# Patient Record
Sex: FEMALE | Race: WHITE | Employment: OTHER | URBAN - NONMETROPOLITAN AREA
[De-identification: names, ages, dates, MRNs, and addresses within clinical notes are randomized per-mention and may not be internally consistent; named-entity substitution may affect disease eponyms.]

---

## 2020-06-08 ENCOUNTER — HOSPITAL ENCOUNTER (OUTPATIENT)
Dept: GENERAL RADIOLOGY | Age: 70
Discharge: HOME | End: 2020-06-08
Payer: MEDICARE

## 2020-06-08 ENCOUNTER — HOSPITAL ENCOUNTER (OUTPATIENT)
Age: 70
Discharge: HOME | End: 2020-06-08
Payer: MEDICARE

## 2020-06-08 PROCEDURE — 72100 X-RAY EXAM L-S SPINE 2/3 VWS: CPT

## 2020-06-08 PROCEDURE — 73521 X-RAY EXAM HIPS BI 2 VIEWS: CPT

## 2020-07-31 ENCOUNTER — HOSPITAL ENCOUNTER (OUTPATIENT)
Dept: MRI IMAGING | Age: 70
Discharge: HOME | End: 2020-07-31
Payer: MEDICARE

## 2020-07-31 PROCEDURE — 72148 MRI LUMBAR SPINE W/O DYE: CPT

## 2020-09-25 ENCOUNTER — HOSPITAL ENCOUNTER (OUTPATIENT)
Dept: NON INVASIVE DIAGNOSTICS | Age: 70
Discharge: HOME | End: 2020-09-25
Payer: MEDICARE

## 2020-09-25 VITALS — WEIGHT: 203 LBS | HEIGHT: 64 IN | BODY MASS INDEX: 34.66 KG/M2

## 2020-09-25 PROCEDURE — A9500 TC99M SESTAMIBI: HCPCS | Performed by: NUCLEAR MEDICINE

## 2020-09-25 PROCEDURE — 93018 CV STRESS TEST I&R ONLY: CPT | Performed by: NUCLEAR MEDICINE

## 2020-09-25 PROCEDURE — 3430000000 HC RX DIAGNOSTIC RADIOPHARMACEUTICAL: Performed by: NUCLEAR MEDICINE

## 2020-09-25 PROCEDURE — 78452 HT MUSCLE IMAGE SPECT MULT: CPT

## 2020-09-25 PROCEDURE — 93016 CV STRESS TEST SUPVJ ONLY: CPT | Performed by: NUCLEAR MEDICINE

## 2020-09-25 PROCEDURE — 93017 CV STRESS TEST TRACING ONLY: CPT | Performed by: NUCLEAR MEDICINE

## 2020-09-25 PROCEDURE — 6360000002 HC RX W HCPCS

## 2020-09-25 PROCEDURE — 78452 HT MUSCLE IMAGE SPECT MULT: CPT | Performed by: NUCLEAR MEDICINE

## 2020-09-25 RX ORDER — HYDRALAZINE HYDROCHLORIDE 50 MG/1
50 TABLET, FILM COATED ORAL 3 TIMES DAILY
COMMUNITY

## 2020-09-25 RX ORDER — IRBESARTAN 300 MG/1
300 TABLET ORAL NIGHTLY
COMMUNITY

## 2020-09-25 RX ORDER — PANTOPRAZOLE SODIUM 40 MG/1
40 TABLET, DELAYED RELEASE ORAL DAILY
COMMUNITY

## 2020-09-25 RX ORDER — POTASSIUM CHLORIDE 7.45 MG/ML
10 INJECTION INTRAVENOUS ONCE
Status: ON HOLD | COMMUNITY
End: 2020-10-05 | Stop reason: HOSPADM

## 2020-09-25 RX ORDER — MELOXICAM 15 MG/1
15 TABLET ORAL DAILY
COMMUNITY

## 2020-09-25 RX ORDER — PAROXETINE HYDROCHLORIDE 20 MG/1
20 TABLET, FILM COATED ORAL EVERY MORNING
COMMUNITY

## 2020-09-25 RX ORDER — AMLODIPINE BESYLATE 10 MG/1
10 TABLET ORAL DAILY
COMMUNITY

## 2020-09-25 RX ADMIN — Medication 9.3 MILLICURIE: at 07:52

## 2020-09-25 RX ADMIN — Medication 33.3 MILLICURIE: at 08:50

## 2020-09-30 ENCOUNTER — TELEPHONE (OUTPATIENT)
Dept: CARDIOLOGY CLINIC | Age: 70
End: 2020-09-30

## 2020-09-30 NOTE — TELEPHONE ENCOUNTER
Muriel LEON on nurse line waiting for cardiac clearance. I called Ari Bhagat back and she is aware we haven't seen patient in the office. Stress test ordered by Dr. Olaf Delgado and completed by Dr. Jenn Og. She will call back if they need anything else.

## 2020-10-02 ENCOUNTER — APPOINTMENT (OUTPATIENT)
Dept: ULTRASOUND IMAGING | Age: 70
DRG: 684 | End: 2020-10-02
Payer: MEDICARE

## 2020-10-02 ENCOUNTER — APPOINTMENT (OUTPATIENT)
Dept: CT IMAGING | Age: 70
DRG: 684 | End: 2020-10-02
Payer: MEDICARE

## 2020-10-02 ENCOUNTER — HOSPITAL ENCOUNTER (INPATIENT)
Age: 70
LOS: 3 days | Discharge: HOME | DRG: 684 | End: 2020-10-05
Attending: FAMILY MEDICINE | Admitting: FAMILY MEDICINE
Payer: MEDICARE

## 2020-10-02 DIAGNOSIS — R63.0 ANOREXIA: ICD-10-CM

## 2020-10-02 DIAGNOSIS — N17.9 AKI (ACUTE KIDNEY INJURY) (HCC): Primary | ICD-10-CM

## 2020-10-02 LAB
ALBUMIN SERPL-MCNC: 3.6 G/DL (ref 3.5–5.1)
ALP BLD-CCNC: 92 U/L (ref 38–126)
ALT SERPL-CCNC: 21 U/L (ref 11–66)
ANION GAP SERPL CALCULATED.3IONS-SCNC: 15 MEQ/L (ref 8–16)
AST SERPL-CCNC: 19 U/L (ref 5–40)
BASOPHILS # BLD: 0.3 %
BASOPHILS ABSOLUTE: 0.1 THOU/MM3 (ref 0–0.1)
BILIRUB SERPL-MCNC: 0.5 MG/DL (ref 0.3–1.2)
BILIRUBIN DIRECT: < 0.2 MG/DL (ref 0–0.3)
BUN BLDV-MCNC: 10 MG/DL (ref 7–22)
CALCIUM SERPL-MCNC: 10 MG/DL (ref 8.5–10.5)
CEA: 1.5 NG/ML (ref 0–5)
CHLORIDE BLD-SCNC: 103 MEQ/L (ref 98–111)
CO2: 22 MEQ/L (ref 23–33)
CREAT SERPL-MCNC: 1.7 MG/DL (ref 0.4–1.2)
EKG ATRIAL RATE: 112 BPM
EKG P AXIS: 41 DEGREES
EKG P-R INTERVAL: 136 MS
EKG Q-T INTERVAL: 344 MS
EKG QRS DURATION: 94 MS
EKG QTC CALCULATION (BAZETT): 469 MS
EKG R AXIS: 29 DEGREES
EKG T AXIS: 7 DEGREES
EKG VENTRICULAR RATE: 112 BPM
EOSINOPHIL # BLD: 0.3 %
EOSINOPHILS ABSOLUTE: 0.1 THOU/MM3 (ref 0–0.4)
ERYTHROCYTE [DISTWIDTH] IN BLOOD BY AUTOMATED COUNT: 12.8 % (ref 11.5–14.5)
ERYTHROCYTE [DISTWIDTH] IN BLOOD BY AUTOMATED COUNT: 43.2 FL (ref 35–45)
GFR SERPL CREATININE-BSD FRML MDRD: 30 ML/MIN/1.73M2
GLUCOSE BLD-MCNC: 111 MG/DL (ref 70–108)
HCT VFR BLD CALC: 33.3 % (ref 37–47)
HEMOGLOBIN: 10.8 GM/DL (ref 12–16)
IMMATURE GRANS (ABS): 0.17 THOU/MM3 (ref 0–0.07)
IMMATURE GRANULOCYTES: 0.9 %
LD: 181 U/L (ref 100–190)
LIPASE: 52 U/L (ref 5.6–51.3)
LYMPHOCYTES # BLD: 0.9 %
LYMPHOCYTES ABSOLUTE: 0.2 THOU/MM3 (ref 1–4.8)
MAGNESIUM: 1.6 MG/DL (ref 1.6–2.4)
MCH RBC QN AUTO: 29.8 PG (ref 26–33)
MCHC RBC AUTO-ENTMCNC: 32.4 GM/DL (ref 32.2–35.5)
MCV RBC AUTO: 91.7 FL (ref 81–99)
MONOCYTES # BLD: 0.5 %
MONOCYTES ABSOLUTE: 0.1 THOU/MM3 (ref 0.4–1.3)
NUCLEATED RED BLOOD CELLS: 0 /100 WBC
OSMOLALITY CALCULATION: 279.1 MOSMOL/KG (ref 275–300)
PLATELET # BLD: 386 THOU/MM3 (ref 130–400)
PMV BLD AUTO: 9.3 FL (ref 9.4–12.4)
POTASSIUM REFLEX MAGNESIUM: 2.9 MEQ/L (ref 3.5–5.2)
RBC # BLD: 3.63 MILL/MM3 (ref 4.2–5.4)
SEG NEUTROPHILS: 97.1 %
SEGMENTED NEUTROPHILS ABSOLUTE COUNT: 19.2 THOU/MM3 (ref 1.8–7.7)
SODIUM BLD-SCNC: 140 MEQ/L (ref 135–145)
TOTAL PROTEIN: 6.8 G/DL (ref 6.1–8)
TSH SERPL DL<=0.05 MIU/L-ACNC: 0.61 UIU/ML (ref 0.4–4.2)
WBC # BLD: 19.8 THOU/MM3 (ref 4.8–10.8)

## 2020-10-02 PROCEDURE — 96361 HYDRATE IV INFUSION ADD-ON: CPT

## 2020-10-02 PROCEDURE — 93005 ELECTROCARDIOGRAM TRACING: CPT | Performed by: NURSE PRACTITIONER

## 2020-10-02 PROCEDURE — 6360000002 HC RX W HCPCS: Performed by: NURSE PRACTITIONER

## 2020-10-02 PROCEDURE — 6360000004 HC RX CONTRAST MEDICATION: Performed by: NURSE PRACTITIONER

## 2020-10-02 PROCEDURE — 80076 HEPATIC FUNCTION PANEL: CPT

## 2020-10-02 PROCEDURE — 2580000003 HC RX 258: Performed by: NURSE PRACTITIONER

## 2020-10-02 PROCEDURE — 74177 CT ABD & PELVIS W/CONTRAST: CPT

## 2020-10-02 PROCEDURE — 96374 THER/PROPH/DIAG INJ IV PUSH: CPT

## 2020-10-02 PROCEDURE — 1200000003 HC TELEMETRY R&B

## 2020-10-02 PROCEDURE — 83735 ASSAY OF MAGNESIUM: CPT

## 2020-10-02 PROCEDURE — 36415 COLL VENOUS BLD VENIPUNCTURE: CPT

## 2020-10-02 PROCEDURE — 83690 ASSAY OF LIPASE: CPT

## 2020-10-02 PROCEDURE — 94760 N-INVAS EAR/PLS OXIMETRY 1: CPT

## 2020-10-02 PROCEDURE — 99284 EMERGENCY DEPT VISIT MOD MDM: CPT

## 2020-10-02 PROCEDURE — 82728 ASSAY OF FERRITIN: CPT

## 2020-10-02 PROCEDURE — 96376 TX/PRO/DX INJ SAME DRUG ADON: CPT

## 2020-10-02 PROCEDURE — 99223 1ST HOSP IP/OBS HIGH 75: CPT | Performed by: FAMILY MEDICINE

## 2020-10-02 PROCEDURE — 84443 ASSAY THYROID STIM HORMONE: CPT

## 2020-10-02 PROCEDURE — 85025 COMPLETE CBC W/AUTO DIFF WBC: CPT

## 2020-10-02 PROCEDURE — 82378 CARCINOEMBRYONIC ANTIGEN: CPT

## 2020-10-02 PROCEDURE — 80048 BASIC METABOLIC PNL TOTAL CA: CPT

## 2020-10-02 PROCEDURE — 83615 LACTATE (LD) (LDH) ENZYME: CPT

## 2020-10-02 PROCEDURE — 83540 ASSAY OF IRON: CPT

## 2020-10-02 PROCEDURE — 84466 ASSAY OF TRANSFERRIN: CPT

## 2020-10-02 RX ORDER — LOSARTAN POTASSIUM 25 MG/1
25 TABLET ORAL DAILY
Status: DISCONTINUED | OUTPATIENT
Start: 2020-10-02 | End: 2020-10-03

## 2020-10-02 RX ORDER — SODIUM CHLORIDE 0.9 % (FLUSH) 0.9 %
10 SYRINGE (ML) INJECTION PRN
Status: DISCONTINUED | OUTPATIENT
Start: 2020-10-02 | End: 2020-10-05 | Stop reason: HOSPADM

## 2020-10-02 RX ORDER — HYDRALAZINE HYDROCHLORIDE 50 MG/1
50 TABLET, FILM COATED ORAL 3 TIMES DAILY
Status: DISCONTINUED | OUTPATIENT
Start: 2020-10-02 | End: 2020-10-03

## 2020-10-02 RX ORDER — PROMETHAZINE HYDROCHLORIDE 25 MG/1
12.5 TABLET ORAL EVERY 6 HOURS PRN
Status: DISCONTINUED | OUTPATIENT
Start: 2020-10-02 | End: 2020-10-05 | Stop reason: HOSPADM

## 2020-10-02 RX ORDER — HEPARIN SODIUM 5000 [USP'U]/ML
5000 INJECTION, SOLUTION INTRAVENOUS; SUBCUTANEOUS EVERY 8 HOURS SCHEDULED
Status: DISCONTINUED | OUTPATIENT
Start: 2020-10-02 | End: 2020-10-03

## 2020-10-02 RX ORDER — PANTOPRAZOLE SODIUM 40 MG/1
40 TABLET, DELAYED RELEASE ORAL DAILY
Status: DISCONTINUED | OUTPATIENT
Start: 2020-10-03 | End: 2020-10-05 | Stop reason: HOSPADM

## 2020-10-02 RX ORDER — PAROXETINE HYDROCHLORIDE 20 MG/1
20 TABLET, FILM COATED ORAL EVERY MORNING
Status: DISCONTINUED | OUTPATIENT
Start: 2020-10-03 | End: 2020-10-05 | Stop reason: HOSPADM

## 2020-10-02 RX ORDER — AMLODIPINE BESYLATE 10 MG/1
10 TABLET ORAL DAILY
Status: DISCONTINUED | OUTPATIENT
Start: 2020-10-02 | End: 2020-10-03

## 2020-10-02 RX ORDER — SODIUM CHLORIDE 9 MG/ML
INJECTION, SOLUTION INTRAVENOUS CONTINUOUS
Status: DISCONTINUED | OUTPATIENT
Start: 2020-10-02 | End: 2020-10-05 | Stop reason: HOSPADM

## 2020-10-02 RX ORDER — SODIUM CHLORIDE 0.9 % (FLUSH) 0.9 %
10 SYRINGE (ML) INJECTION EVERY 12 HOURS SCHEDULED
Status: DISCONTINUED | OUTPATIENT
Start: 2020-10-02 | End: 2020-10-05 | Stop reason: HOSPADM

## 2020-10-02 RX ORDER — ACETAMINOPHEN 650 MG/1
650 SUPPOSITORY RECTAL EVERY 6 HOURS PRN
Status: DISCONTINUED | OUTPATIENT
Start: 2020-10-02 | End: 2020-10-05 | Stop reason: HOSPADM

## 2020-10-02 RX ORDER — ONDANSETRON 2 MG/ML
4 INJECTION INTRAMUSCULAR; INTRAVENOUS EVERY 6 HOURS PRN
Status: DISCONTINUED | OUTPATIENT
Start: 2020-10-02 | End: 2020-10-05 | Stop reason: HOSPADM

## 2020-10-02 RX ORDER — 0.9 % SODIUM CHLORIDE 0.9 %
1000 INTRAVENOUS SOLUTION INTRAVENOUS ONCE
Status: COMPLETED | OUTPATIENT
Start: 2020-10-02 | End: 2020-10-02

## 2020-10-02 RX ORDER — POTASSIUM CHLORIDE 7.45 MG/ML
20 INJECTION INTRAVENOUS ONCE
Status: DISCONTINUED | OUTPATIENT
Start: 2020-10-02 | End: 2020-10-02 | Stop reason: CLARIF

## 2020-10-02 RX ORDER — ACETAMINOPHEN 325 MG/1
650 TABLET ORAL EVERY 6 HOURS PRN
Status: DISCONTINUED | OUTPATIENT
Start: 2020-10-02 | End: 2020-10-05 | Stop reason: HOSPADM

## 2020-10-02 RX ORDER — POLYETHYLENE GLYCOL 3350 17 G/17G
17 POWDER, FOR SOLUTION ORAL DAILY PRN
Status: DISCONTINUED | OUTPATIENT
Start: 2020-10-02 | End: 2020-10-05 | Stop reason: HOSPADM

## 2020-10-02 RX ORDER — POTASSIUM CHLORIDE 7.45 MG/ML
10 INJECTION INTRAVENOUS
Status: COMPLETED | OUTPATIENT
Start: 2020-10-02 | End: 2020-10-02

## 2020-10-02 RX ADMIN — POTASSIUM CHLORIDE 10 MEQ: 7.46 INJECTION, SOLUTION INTRAVENOUS at 20:01

## 2020-10-02 RX ADMIN — SODIUM CHLORIDE 1000 ML: 9 INJECTION, SOLUTION INTRAVENOUS at 17:30

## 2020-10-02 RX ADMIN — IOPAMIDOL 80 ML: 755 INJECTION, SOLUTION INTRAVENOUS at 18:21

## 2020-10-02 RX ADMIN — POTASSIUM CHLORIDE 10 MEQ: 7.46 INJECTION, SOLUTION INTRAVENOUS at 21:09

## 2020-10-02 SDOH — HEALTH STABILITY: MENTAL HEALTH: HOW OFTEN DO YOU HAVE A DRINK CONTAINING ALCOHOL?: NEVER

## 2020-10-02 ASSESSMENT — ENCOUNTER SYMPTOMS
VOMITING: 1
BLOOD IN STOOL: 0
NAUSEA: 1
SORE THROAT: 0
CHEST TIGHTNESS: 0
ABDOMINAL PAIN: 0
SINUS PRESSURE: 0
DIARRHEA: 1
SHORTNESS OF BREATH: 0
CONSTIPATION: 0

## 2020-10-02 NOTE — H&P
HISTORY & PHYSICAL       Patient:  Mat Mims  YOB: 1950  MRN: [id number]     Acct: [de-identified]    PCP: Makayla Perez MD    Date of Admission: 10/2/2020    Date of Service: Pt seen/examined in emergency department with expected LOS less than two midnights due to medical therapy. ASSESSMENT/PLAN:    1. EYAL, stage I   Creatinine 1.7 in ED   Likely secondary to combination of anemia and dehydration   IV fluids 75 mL/hour   Daily BMP  2. Normocytic, normochromic anemia  · Hemoglobin 10.8 in the ED  · No obvious source of bleeding  · Patient scheduled for colonoscopy on 10//6 oupatient  · Iron studies present a mixed iron deficiency and anemia of chronic disease picture. Recheck ferritin once outpatient since acute phase reactant. · Consult GI for colonoscopy  3. Hypokalemia  · 2.9 in the ED  · 30 mEq potassium given in ED via IV  · Likely secondary to vomiting/diarrhea  · Recheck BMP  4. Leukocytosis  · WBC 19.8 in ED  · Greater than 15 pound weight loss in the last month  · Loss of appetite  · Elevated segmented neutrophils, likely reactive  · Continue to trend CBC  5. Vomiting/diarrhea   Patient had numerous episodes of vomiting today   She has had diarrhea for last 2 weeks   PRN zofran   PRN immodium  6. Complex structure of the left kidney  · Per impression on CT, could be a hematoma but there is no definite gas within it and it will need to be followed   · Ultrasound of kidneys pending  7. Adrenal nodule, right   Seen incidentally on CT   Colonoscopy first, if inconclusive for source of anemia and weight loss, work up  8.  Hypertension, with concern for orthostatic hypotension  · Hold home amlodipine 10 mg po daily    Hold home hydralazine 50 mg po TID   Hold BP meds for now due to lightheadedness   Orthostatics pending      Chief Complaint:  EYAL      History Of Present Illness:     77 y.o. female who presented to [address] [place] [address] with nausea, vomiting, chills, shaking, and lightheadedness following a lab draw earlier today. She has been having decreased appetite for the last 4 weeks, which she initially thought was due to food poisoning. It initially started with nausea and vomiting. She has barely eaten anything for the last 4 weeks, and mostly drinks water or Gatorade. She has been having increased fatigue and weakness. Patient does not know her family history because she is adopted. She has never had a colonoscopy, but has not noticed any changes in her bowel habits. She denies dark or tarry stools. She does have diarrhea frequently for the last 2 weeks. She denies chest pain or shortness of breath, including with exertion. Past Medical History:          Diagnosis Date    Arthritis     Hyperlipidemia     Hypertension        Past Surgical History:          Procedure Laterality Date    CARPAL TUNNEL RELEASE Bilateral     CHOLECYSTECTOMY      HYSTERECTOMY      partial    SINUS SURGERY         Medications Prior to Admission:      Prior to Admission medications    Medication Sig Start Date End Date Taking? Authorizing Provider   irbesartan (AVAPRO) 300 MG tablet Take 300 mg by mouth nightly    Historical Provider, MD   potassium chloride 10 MEQ/100ML Infuse 10 mEq intravenously once    Historical Provider, MD   amLODIPine (NORVASC) 10 MG tablet Take 10 mg by mouth daily    Historical Provider, MD   meloxicam (MOBIC) 15 MG tablet Take 15 mg by mouth daily    Historical Provider, MD   hydrALAZINE (APRESOLINE) 50 MG tablet Take 50 mg by mouth 3 times daily    Historical Provider, MD   pantoprazole (PROTONIX) 40 MG tablet Take 40 mg by mouth daily    Historical Provider, MD   PARoxetine (PAXIL) 20 MG tablet Take 20 mg by mouth every morning    Historical Provider, MD       Allergies:  Cymbalta [duloxetine hcl]    Social History:      The patient currently lives at home. TOBACCO:   reports that she has never smoked.  She has never used smokeless tobacco.  ETOH:   reports no history of alcohol use. Family History:      Reviewed in detail for DM, CAD, Cancer, CVA. Positive as follows:    History reviewed. No pertinent family history. Diet:  Diet NPO Effective Now    Constitutional: Negative for chills and fever. HENT: Negative for congestion and sore throat. Eyes: Negative for visual disturbance. Respiratory: Negative for cough and shortness of breath. Cardiovascular: Negative for chest pain. Gastrointestinal: Negative for abdominal pain. She denies blood in stools or vomit. Positive for diarrhea, nausea, and vomiting. Genitourinary: Negative for dysuria. Skin: Negative for rash. Neurological: Negative for dizziness and headaches. Positive for weakness and lightheadedness. Psychiatric/Behavioral: The patient is not nervous/anxious. PHYSICAL EXAM:    BP (!) 116/57   Pulse 78   Temp 97.9 °F (36.6 °C) (Oral)   Resp 18   Ht 5' 4\" (1.626 m)   Wt 199 lb (90.3 kg)   SpO2 97%   BMI 34.16 kg/m²     Constitutional:       General: she is not in acute distress. She is very pale. Appearance: Normal appearance. HENT:      Head: Normocephalic. Nose: Nose normal.      Mouth: Mucous membranes are moist.   Eyes:      General: No scleral icterus. Extraocular Movements: Extraocular movements intact. Neck:      Musculoskeletal: Normal range of motion. Cardiovascular:      Rate and Rhythm: Regular rhythm. Tachycardia. .      Pulses: Normal pulses. Heart sounds: Normal heart sounds. Pulmonary:      Effort: Pulmonary effort is normal.      Breath sounds: Normal breath sounds. Abdominal:      General: Abdomen is flat. There is no distension. Palpations: Abdomen is soft. Tender to palpation in right lower quadrant. Musculoskeletal: Normal range of motion. Skin:     General: Skin is warm and dry. Very pale appearing. Neurological:      Mental Status: she is alert. Motor: No weakness. Psychiatric:         Mood and Affect: Mood normal.     Labs:     Recent Labs     10/02/20  1657   WBC 19.8*   HGB 10.8*   HCT 33.3*        Recent Labs     10/02/20  1657      K 2.9*      CO2 22*   BUN 10   CREATININE 1.7*   CALCIUM 10.0     Recent Labs     10/02/20  1657   AST 19   ALT 21   BILIDIR <0.2   BILITOT 0.5   ALKPHOS 92     No results for input(s): INR in the last 72 hours. No results for input(s): Maroa Lager in the last 72 hours. Urinalysis:    No results found for: NITRU, WBCUA, BACTERIA, RBCUA, BLOODU, SPECGRAV, GLUCOSEU    Intake & Output:  I/O last 3 completed shifts: In: 48 [P.O.:50]  Out: -   No intake/output data recorded. Radiology:     CT ABDOMEN PELVIS W IV CONTRAST Additional Contrast? None   Final Result   Complex structure extending out of the left kidney could relate to a hematoma as there is no definite gas within it. It will need to be followed. No definite bowel lesion. Small right adrenal nodule. **This report has been created using voice recognition software. It may contain minor errors which are inherent in voice recognition technology. **      Final report electronically signed by Dr. Marquez June on 10/2/2020 6:44 PM      US RENAL COMPLETE    (Results Pending)            DVT prophylaxis: [] Lovenox                                 [x] SCDs                                 [] SQ Heparin                                 [] Encourage ambulation           [] Already on Anticoagulation    Code Status: Full Code    Disposition:    [x] Home       [] TCU       [] Rehab       [] Psych       [] SNF       [] [place]       [] Other-      Thank you Cisco Alba MD for the opportunity to be involved in this patient's care.     Electronically signed by Jorge Luis Kothari DO on 10/3/2020 at 5:00 AM

## 2020-10-02 NOTE — ED NOTES
Patient returns from radiology, updated on plan of care and provided with ice chips for comfort.       Carmen Camargo RN  10/02/20 6622

## 2020-10-02 NOTE — ED NOTES
Family at bedside states that patient has not had an appetite at all and they have been unable to get her to eat x 48 hours.      Willian Howard RN  10/02/20 1123

## 2020-10-02 NOTE — ED PROVIDER NOTES
[facility]  EMERGENCY DEPARTMENT ENCOUNTER          CHIEF COMPLAINT     No chief complaint on file. Nurses Notes reviewed and I agree except as noted in the HPI. HISTORY OF PRESENT ILLNESS    Mat Mims is a 77 y.o. female who presents to the Emergency Department for the evaluation of decreased appetite, nausea, and vomiting. The patient states that she got lab work drawn earlier today, and immediately following her lab draw she experienced an episode of nausea, vomiting, chills, shaking, and lightheadedness. She states that she has been experiencing decreased appetite for at least 4 weeks. Her daughter states that when symptoms began she experienced 2 days of nausea and vomiting, which they assumed was food poisoning. Once the nausea and vomiting subsided, the anorexia remained and has not improved over the last 4 weeks. She states she has not had vomiting during this time. She states if she is able to eat anything, it is only a bite or 2 daily. She is drinking 1-1.5 bottles of water or Gatorade daily. She has not had anything to eat in at least 48 hours. Associated symptoms include dry mouth, loose stools, fatigue, weakness, and belching. Her daughter states that per her PCP office she has experienced a 15 pound weight loss in the last 3 weeks. She has been seeking care from her PCP during this time and has an EGD and Colonoscopy scheduled for Tuesday, 10/6/20, with Dr Jessi Singh. She states that she has had a hysterectomy, cholecystectomy, and possibly an appendectomy. She denies chest pain, palpitations, shortness of breath, dizzines, blood in stools, urine or vomit, fever, or dysuria. The HPI was provided by the patient. REVIEW OF SYSTEMS     Review of Systems   Constitutional: Positive for appetite change, chills and fatigue. Negative for fever. HENT: Negative for congestion, sinus pressure and sore throat. Eyes: Negative for visual disturbance.    Respiratory: Negative for chest tightness and shortness of breath. Cardiovascular: Negative for chest pain and palpitations. Gastrointestinal: Positive for diarrhea, nausea and vomiting. Negative for abdominal pain, blood in stool and constipation. Genitourinary: Negative for difficulty urinating, dysuria, frequency and hematuria. Neurological: Positive for weakness and light-headedness. Negative for dizziness and syncope. PAST MEDICAL HISTORY    has a past medical history of Arthritis, Hyperlipidemia, and Hypertension. SURGICAL HISTORY      has a past surgical history that includes Hysterectomy; Cholecystectomy; sinus surgery; Carpal tunnel release (Bilateral); and Upper gastrointestinal endoscopy (N/A, 10/4/2020). CURRENT MEDICATIONS       Discharge Medication List as of 10/5/2020  3:16 PM      CONTINUE these medications which have NOT CHANGED    Details   irbesartan (AVAPRO) 300 MG tablet Take 300 mg by mouth nightlyHistorical Med      amLODIPine (NORVASC) 10 MG tablet Take 10 mg by mouth dailyHistorical Med      meloxicam (MOBIC) 15 MG tablet Take 15 mg by mouth dailyHistorical Med      hydrALAZINE (APRESOLINE) 50 MG tablet Take 50 mg by mouth 3 times dailyHistorical Med      pantoprazole (PROTONIX) 40 MG tablet Take 40 mg by mouth dailyHistorical Med      PARoxetine (PAXIL) 20 MG tablet Take 20 mg by mouth every morningHistorical Med             ALLERGIES     is allergic to cymbalta [duloxetine hcl]. FAMILY HISTORY     has no family status information on file. family history is not on file. SOCIAL HISTORY      reports that she has never smoked. She has never used smokeless tobacco. She reports that she does not drink alcohol or use drugs. PHYSICAL EXAM     INITIAL VITALS:  height is 5' 4\" (1.626 m) and weight is 199 lb (90.3 kg). Her oral temperature is 97.9 °F (36.6 °C). Her blood pressure is 146/69 (abnormal) and her pulse is 81. Her respiration is 18 and oxygen saturation is 97%. Physical Exam  Vitals signs and nursing note reviewed. Constitutional:       Appearance: Normal appearance. HENT:      Head: Normocephalic and atraumatic. Mouth/Throat:      Mouth: Mucous membranes are moist.      Pharynx: Oropharynx is clear. Eyes:      Comments: Conjunctiva pale   Cardiovascular:      Rate and Rhythm: Regular rhythm. Tachycardia present. Heart sounds: Normal heart sounds. Pulmonary:      Effort: Pulmonary effort is normal.      Breath sounds: Normal breath sounds. Abdominal:      General: Bowel sounds are normal.      Palpations: Abdomen is soft. Tenderness: There is abdominal tenderness (RLQ) in the right lower quadrant. There is no guarding. Comments: Heartbeat heard diffusely throughout abdomen   Skin:     General: Skin is warm and dry. Neurological:      General: No focal deficit present. Mental Status: She is alert. DIFFERENTIAL DIAGNOSIS:   Dehydration, EYAL, malignancy, bowel obstruction, ileus    DIAGNOSTIC RESULTS     EKG: All EKG's are interpreted by the Emergency Department Physician who either signs or Co-signs this chart in the absence of a cardiologist.    Sinus tachycardia with rate of 112, , QRS of 94, QTc of 469. No previous EKG for comparison    EKG interpreted by ED physician    RADIOLOGY: non-plainfilm images(s) such as CT, Ultrasound and MRI are read by the radiologist.    US RENAL COMPLETE   Final Result   1. The abnormal soft tissue density posterior to the left kidney and involving the left psoas muscle seen on recent CT scan was not demonstrated on today's renal ultrasound. Findings on CT are suspicious for hematoma. Clinical correlation recommended. Follow-up CT of the abdomen or MRI of the abdomen would be helpful for further evaluation. 2. 2 cm benign-appearing left renal cyst. Findings suggestive of medical renal disease. **This report has been created using voice recognition software.   It may contain minor errors which are inherent in voice recognition technology. **      Final report electronically signed by Dr. Elke Fuller on 10/3/2020 8:30 AM      CT ABDOMEN PELVIS W IV CONTRAST Additional Contrast? None   Final Result   Complex structure extending out of the left kidney could relate to a hematoma as there is no definite gas within it. It will need to be followed. No definite bowel lesion. Small right adrenal nodule. **This report has been created using voice recognition software. It may contain minor errors which are inherent in voice recognition technology. **      Final report electronically signed by Dr. Douglas Foster on 10/2/2020 6:44 PM          LABS:     Labs Reviewed   CBC WITH AUTO DIFFERENTIAL - Abnormal; Notable for the following components:       Result Value    WBC 19.8 (*)     RBC 3.63 (*)     Hemoglobin 10.8 (*)     Hematocrit 33.3 (*)     MPV 9.3 (*)     Segs Absolute 19.2 (*)     Lymphocytes Absolute 0.2 (*)     Monocytes Absolute 0.1 (*)     Immature Grans (Abs) 0.17 (*)     All other components within normal limits   BASIC METABOLIC PANEL W/ REFLEX TO MG FOR LOW K - Abnormal; Notable for the following components:    Potassium reflex Magnesium 2.9 (*)     CO2 22 (*)     Glucose 111 (*)     CREATININE 1.7 (*)     All other components within normal limits   LIPASE - Abnormal; Notable for the following components:    Lipase 52.0 (*)     All other components within normal limits   GLOMERULAR FILTRATION RATE, ESTIMATED - Abnormal; Notable for the following components:    Est, Glom Filt Rate 30 (*)     All other components within normal limits   BASIC METABOLIC PANEL W/ REFLEX TO MG FOR LOW K - Abnormal; Notable for the following components:    Potassium reflex Magnesium 3.0 (*)     CREATININE 1.3 (*)     All other components within normal limits   CBC - Abnormal; Notable for the following components:    WBC 21.4 (*)     RBC 3.13 (*)     Hemoglobin 9.3 (*) TSH WITHOUT REFLEX   LACTATE DEHYDROGENASE   HEPATIC FUNCTION PANEL   ANION GAP   MAGNESIUM   OSMOLALITY   CEA   ANION GAP   MAGNESIUM   OSMOLALITY   ANION [facility]   SURGICAL PATHOLOGY    Narrative:     [facility]               [phone]  [address], [address]                                                      PROC: 10/04/2020  [facility]                                    RECV: 10/05/2020  [address]                                    RPTD: 10/06/2020  [facility], [address]                      MRN:  [id number]     LOC: 7KN                      ACCT: [de-identified]  SEX: F                      : 1950  AGE: 77 Y                         PATHOLOGY REPORT                      ATTN: Lamberto Clarke                      REQ: JEFF GALEAS      Copies To:   Oscar Clifton; Lamberto Clarke      Clinical Information: VOMITING, DIARRHEA, REDUCED APPETITE    FINAL DIAGNOSIS:  Small bowel, biopsy:   Slightly increased intraepithelial lymphocytes and preserved villous  architecture. Specimen:  SMALL BOWEL BIOPSY, R/O MALABSORPTION      Gross Examination:  The container is labeled Annie Edmondson, small bowel biopsies, rule  out malabsorption. Received in formalin are multiple bits of tan  tissue aggregating to 0.6 x 0.5 x 0.2 cm.  1 ns. PCF/DKR:[id number]      Microscopic Examination:  Sections show duodenal mucosa with minimal attenuation of villous  architecture and a slight increase of intraepithelial lymphocytes,  including at occasional villi tips. The lamina propria shows a  relatively normal complement of inflammatory cells. The findings are  relatively nonspecific. The histologic differential diagnosis includes  celiac disease, Helicobacter gastritis, NSAID injury, bacterial  overgrowth, and autoimmune conditions, among others. Clinical  correlation is recommended.     [id number] <Sign Out Dr. Leana Mcneal M.D., F.C. A. P      [facility]/ [address] [address]  Printed on:  10/6/2020  Patti Raza [address]  [address], [address]  Original print date: 10/06/2020   URINE RT REFLEX TO CULTURE   BLOOD OCCULT STOOL SCREEN #1   MISCELLANEOUS LAB TEST #1   MISCELLANEOUS LAB TEST #2   MISCELLANEOUS LAB TEST #3       EMERGENCY DEPARTMENT COURSE:   Vitals:    Vitals:    10/05/20 0345 10/05/20 0800 10/05/20 1041 10/05/20 1100   BP: (!) 140/69 135/71  (!) 146/69   Pulse: 86 87  81   Resp: 18 16  18   Temp: 98.7 °F (37.1 °C) 98.2 °F (36.8 °C)  97.9 °F (36.6 °C)   TempSrc: Oral Oral  Oral   SpO2: 96% 96% 99% 97%   Weight:       Height:           4:54 PM EDT: The patient was seen and evaluated. MDM:  Patient was seen and evaluated for loss of appetite and anorexia for the past 4 weeks having eaten nothing for the past 48 hours. Daughter reports a 15 pound weight loss in the past month. Patient denies emesis, simply states that the sight of foods makes her nauseous. She denied any pain. Labs and imaging were ordered, noted to by hypokalemic with potassium of 2.9, elevated creatinine from baseline: 1.7. I believe this is likely due to dehydration. Patient hydrated with IV fluids, potassium replacement was initiated. CT of the abdomen showed complex structure extending from left kidney. I discussed all findings with Dakotah Phelan from the hospitalist service who graciously agreed to admit the patient. Patient and daughter were amenable to this plan    CRITICAL CARE:   none    CONSULTS:  Tanvi Nation Hospitalist DNP    PROCEDURES:  none    FINAL IMPRESSION      1. EYAL (acute kidney injury) ([facility] [address]    2.  Anorexia          DISPOSITION/PLAN   Admit    PATIENT REFERRED TO:  Farooq Paniagua MD  [facility] [phone]    In 1 day  at 11:30 am for Colonoscopy    Selena Pallas, MD  [address] [place]  [phone]    On 10/19/2020  11:00 am      DISCHARGE MEDICATIONS:  Discharge Medication List as of 10/5/2020  3:16 PM          (Please note that portions of this note were completed with a voice recognition program.  Efforts were made to edit the dictations but occasionally words are mis-transcribed.)    The patient was given an opportunity to see the Emergency Attending. The patient voiced understanding that I was a Mid-LevelProvider and was in agreement with being seen independently by myself. Provider:  I personally performed the services described in the documentation, reviewed and edited the documentation which was dictated to the scribe in my presence, and it accurately records my words and actions.     DOUG Jaffe CNP, 10/2/20, 9:53 PM        DOUG Jaffe CNP  10/07/20 2472

## 2020-10-02 NOTE — ED TRIAGE NOTES
Has been nauseated and loose stools for last 2 weeks. [facility] per PCP after which she started to shaky  And more nausated. Said her last hemoglobin check was low.

## 2020-10-03 ENCOUNTER — APPOINTMENT (OUTPATIENT)
Dept: ULTRASOUND IMAGING | Age: 70
DRG: 684 | End: 2020-10-03
Payer: MEDICARE

## 2020-10-03 LAB
ANION GAP SERPL CALCULATED.3IONS-SCNC: 11 MEQ/L (ref 8–16)
BUN BLDV-MCNC: 10 MG/DL (ref 7–22)
CALCIUM SERPL-MCNC: 9.3 MG/DL (ref 8.5–10.5)
CHLORIDE BLD-SCNC: 105 MEQ/L (ref 98–111)
CO2: 23 MEQ/L (ref 23–33)
CREAT SERPL-MCNC: 1.3 MG/DL (ref 0.4–1.2)
ERYTHROCYTE [DISTWIDTH] IN BLOOD BY AUTOMATED COUNT: 13.2 % (ref 11.5–14.5)
ERYTHROCYTE [DISTWIDTH] IN BLOOD BY AUTOMATED COUNT: 45 FL (ref 35–45)
FERRITIN: 311 NG/ML (ref 10–291)
GFR SERPL CREATININE-BSD FRML MDRD: 41 ML/MIN/1.73M2
GLUCOSE BLD-MCNC: 93 MG/DL (ref 70–108)
HCT VFR BLD CALC: 29.3 % (ref 37–47)
HEMOGLOBIN: 9.3 GM/DL (ref 12–16)
IRON SATURATION: 6 % (ref 20–50)
IRON: 9 UG/DL (ref 50–170)
MAGNESIUM: 1.8 MG/DL (ref 1.6–2.4)
MCH RBC QN AUTO: 29.7 PG (ref 26–33)
MCHC RBC AUTO-ENTMCNC: 31.7 GM/DL (ref 32.2–35.5)
MCV RBC AUTO: 93.6 FL (ref 81–99)
OSMOLALITY CALCULATION: 276.3 MOSMOL/KG (ref 275–300)
PLATELET # BLD: 334 THOU/MM3 (ref 130–400)
PMV BLD AUTO: 9.6 FL (ref 9.4–12.4)
POTASSIUM REFLEX MAGNESIUM: 3 MEQ/L (ref 3.5–5.2)
RBC # BLD: 3.13 MILL/MM3 (ref 4.2–5.4)
SODIUM BLD-SCNC: 139 MEQ/L (ref 135–145)
TOTAL IRON BINDING CAPACITY: 141 UG/DL (ref 171–450)
TRANSFERRIN: 136 MG/DL (ref 200–360)
WBC # BLD: 21.4 THOU/MM3 (ref 4.8–10.8)

## 2020-10-03 PROCEDURE — 96361 HYDRATE IV INFUSION ADD-ON: CPT

## 2020-10-03 PROCEDURE — 6360000002 HC RX W HCPCS: Performed by: PHYSICIAN ASSISTANT

## 2020-10-03 PROCEDURE — 85027 COMPLETE CBC AUTOMATED: CPT

## 2020-10-03 PROCEDURE — 99232 SBSQ HOSP IP/OBS MODERATE 35: CPT | Performed by: PHYSICIAN ASSISTANT

## 2020-10-03 PROCEDURE — 6370000000 HC RX 637 (ALT 250 FOR IP): Performed by: STUDENT IN AN ORGANIZED HEALTH CARE EDUCATION/TRAINING PROGRAM

## 2020-10-03 PROCEDURE — 76770 US EXAM ABDO BACK WALL COMP: CPT

## 2020-10-03 PROCEDURE — 6360000002 HC RX W HCPCS: Performed by: STUDENT IN AN ORGANIZED HEALTH CARE EDUCATION/TRAINING PROGRAM

## 2020-10-03 PROCEDURE — 80048 BASIC METABOLIC PNL TOTAL CA: CPT

## 2020-10-03 PROCEDURE — 96376 TX/PRO/DX INJ SAME DRUG ADON: CPT

## 2020-10-03 PROCEDURE — 96375 TX/PRO/DX INJ NEW DRUG ADDON: CPT

## 2020-10-03 PROCEDURE — 94760 N-INVAS EAR/PLS OXIMETRY 1: CPT

## 2020-10-03 PROCEDURE — 1200000003 HC TELEMETRY R&B

## 2020-10-03 PROCEDURE — 36415 COLL VENOUS BLD VENIPUNCTURE: CPT

## 2020-10-03 PROCEDURE — 2580000003 HC RX 258: Performed by: STUDENT IN AN ORGANIZED HEALTH CARE EDUCATION/TRAINING PROGRAM

## 2020-10-03 PROCEDURE — 83735 ASSAY OF MAGNESIUM: CPT

## 2020-10-03 RX ORDER — POTASSIUM CHLORIDE 20 MEQ/1
20 TABLET, EXTENDED RELEASE ORAL 2 TIMES DAILY WITH MEALS
Status: DISCONTINUED | OUTPATIENT
Start: 2020-10-04 | End: 2020-10-05 | Stop reason: HOSPADM

## 2020-10-03 RX ORDER — POTASSIUM CHLORIDE 20 MEQ/1
40 TABLET, EXTENDED RELEASE ORAL PRN
Status: DISCONTINUED | OUTPATIENT
Start: 2020-10-03 | End: 2020-10-05 | Stop reason: HOSPADM

## 2020-10-03 RX ORDER — LOPERAMIDE HYDROCHLORIDE 2 MG/1
2 CAPSULE ORAL 4 TIMES DAILY PRN
Status: DISCONTINUED | OUTPATIENT
Start: 2020-10-03 | End: 2020-10-05 | Stop reason: HOSPADM

## 2020-10-03 RX ORDER — POTASSIUM CHLORIDE 7.45 MG/ML
10 INJECTION INTRAVENOUS PRN
Status: DISCONTINUED | OUTPATIENT
Start: 2020-10-03 | End: 2020-10-05 | Stop reason: HOSPADM

## 2020-10-03 RX ADMIN — SODIUM CHLORIDE: 9 INJECTION, SOLUTION INTRAVENOUS at 09:00

## 2020-10-03 RX ADMIN — ONDANSETRON 4 MG: 2 INJECTION INTRAMUSCULAR; INTRAVENOUS at 21:53

## 2020-10-03 RX ADMIN — PAROXETINE HYDROCHLORIDE 20 MG: 20 TABLET, FILM COATED ORAL at 21:24

## 2020-10-03 RX ADMIN — POTASSIUM CHLORIDE 10 MEQ: 7.46 INJECTION, SOLUTION INTRAVENOUS at 17:28

## 2020-10-03 RX ADMIN — POTASSIUM CHLORIDE 10 MEQ: 7.46 INJECTION, SOLUTION INTRAVENOUS at 23:58

## 2020-10-03 RX ADMIN — POTASSIUM CHLORIDE 10 MEQ: 7.46 INJECTION, SOLUTION INTRAVENOUS at 15:19

## 2020-10-03 RX ADMIN — PANTOPRAZOLE SODIUM 40 MG: 40 TABLET, DELAYED RELEASE ORAL at 06:01

## 2020-10-03 RX ADMIN — ACETAMINOPHEN 650 MG: 325 TABLET ORAL at 15:24

## 2020-10-03 RX ADMIN — POTASSIUM CHLORIDE 10 MEQ: 7.46 INJECTION, SOLUTION INTRAVENOUS at 18:31

## 2020-10-03 RX ADMIN — POTASSIUM CHLORIDE 10 MEQ: 7.46 INJECTION, SOLUTION INTRAVENOUS at 21:25

## 2020-10-03 ASSESSMENT — PAIN SCALES - GENERAL: PAINLEVEL_OUTOF10: 8

## 2020-10-03 NOTE — ED NOTES
Pt is transported to [facility] without incident. Floor nurse was contacted prior to departure.

## 2020-10-03 NOTE — ED NOTES
ED to inpatient nurses report    No chief complaint on file. Present to ED from home  LOC: alert and orientated to name, place, date  Vital signs   Vitals:    10/02/20 1513 10/02/20 1650 10/02/20 1833 10/02/20 2001   BP: 129/72 128/72 (!) 117/59 (!) 107/59   Pulse: 132 106 103 97   Resp: 20 18 18 20   Temp: 99.8 °F (37.7 °C)      TempSrc: Oral      SpO2: 99% 98% 99% 98%   Weight: 202 lb (91.6 kg)      Height: 5' 4\" (1.626 m)         Oxygen Baseline 98% Room Air    Current needs required Room Air Bipap/Cpap No  LDAs:   Peripheral IV 10/02/20 Left Forearm (Active)   Site Assessment Clean;Dry; Intact 10/02/20 1704   Line Status Blood return noted; Flushed; Infusing;Specimen collected 10/02/20 1704   Dressing Status Clean;Dry; Intact 10/02/20 1704   Dressing Intervention New 10/02/20 1704     Mobility: Independent  Pending ED orders: Urine  Present condition: Stable    Electronically signed by Linda Grullon RN on 10/2/2020 at 8:28 PM       Linda Grullon [facility]  10/02/20 2028

## 2020-10-03 NOTE — PLAN OF CARE
Problem: Falls - Risk of:  Goal: Will remain free from falls  Description: Will remain free from falls  Outcome: Ongoing  Note: Patient free from falls this shift. Patient ambulates with walker and one assist.  Bed alarm on for patient safety. Problem: GI  Goal: No bowel complications  Outcome: Ongoing  Note: Patient has hypoactive bowel sounds in all quadrants. Pt complains of loose stools. Problem: Discharge Planning:  Goal: Discharged to appropriate level of care  Description: Discharged to appropriate level of care  Outcome: Ongoing  Note: Patient plans to return home alone at discharge. Care plan reviewed with patient. Patient verbalizes understanding of the plan of care and contribute to goal setting.

## 2020-10-03 NOTE — PROGRESS NOTES
Hospitalist Progress Note      Patient:  Ahsan Lopez    Unit/Bed:5K-18/018-A  YOB: 1950  MRN: [id number]   Acct: [de-identified]   PCP: Vito Coronel MD  Date of Admission: 10/2/2020    Assessment/Plan:    1. Hypokalemia: likely related to GI losses/ reduced oral intake. K 2.9 on admission, now 3.0. Replete per protocol and continue to monitor. EKG showed sinus tachycardia and ST & T wave abnormality    2. Vomiting / diarrhea / reduced appetite: requests care with Dr. Leanne Wills, pt apparently already has scheduled EGD with him 10/6/20. Will send GI panel for stool. Antiemetics as needed. Will allow clear liquid diet pending GI consultation. CEA wnl.   3. EYAL on ?probable CKD: likely pre-renal given above presentation. Cr 1.7 on admission down to 1.3. Renal US showed findings suggestive of medical renal disease. Avoid nephrotoxic agents, continue with IVFs for now. 4. Acute normocytic anemia: last Hgb 1/2019 14.8, now 10.8 on admission and down to 9.3. Suspect some dilutional component with IVFs. Iron studies show low iron. Increased Vit B12. No gross bleeding noted. Will obtain FOBT. Transfuse for Hgb < 7 or hemodynamic instability. 5. Possible hematoma of left kidney: incidental finding on CT A/P. Will need to follow. Renal US did not exhibit this finding. However, suggest follow up CT of abd or MRI of abd for further evaluation. Defer to GI, consulted. 6. 2 cm benign- appearing left renal cyst: noted on Renal US, aware  7. Adrenal nodule: noted on CT A/P, may require further workup pending GI evaluation  8. Essential HTN, stable: BP has been on low-normal side. Takes Avapro, Norvasc, and Apresoline which were not reordered. Monitor and resume as appropriate. 9. Arthritis: hx of such, bilateral hips  10.  Obesity: BMI 34.16    Chief Complaint: EYAL    Initial H and P:-    \"69 y.o. female who presented to [facility] with nausea, vomiting, chills, shaking, and lightheadedness following a lab draw earlier today. She has been having decreased appetite for the last 4 weeks, which she initially thought was due to food poisoning. It initially started with nausea and vomiting. She has barely eaten anything for the last 4 weeks, and mostly drinks water or Gatorade. She has been having increased fatigue and weakness.     Patient does not know her family history because she is adopted.     She has never had a colonoscopy, but has not noticed any changes in her bowel habits. She denies dark or tarry stools. She does have diarrhea frequently for the last 2 weeks.     She denies chest pain or shortness of breath, including with exertion. \"    Subjective (past 24 hours):   10/3- pt very upset about not being able to drink. Pt denies appetite but states she has strong thirst. Denies fever, reports chills upon waking. Notes she has increased fatigue/weakness over the past 4-5 weeks, denies associated dizziness. Denies CP/SOB. No abd pain, reports intermittent nausea with some emesis. BMs have been diarrheal in nature for the past 4-5 weeks, but pt notes that it is not voluminous. Past medical history, family history, social history and allergies reviewed again and is unchanged since admission. ROS (12 point review of systems completed. Pertinent positives noted.  Otherwise ROS is negative)     Medications:  Reviewed    Infusion Medications    sodium chloride 75 mL/hr at 10/03/20 0900     Scheduled Medications    pantoprazole  40 mg Oral Daily    PARoxetine  20 mg Oral QAM    sodium chloride flush  10 mL Intravenous 2 times per day     PRN Meds: loperamide, sodium chloride flush, acetaminophen **OR** acetaminophen, polyethylene glycol, promethazine **OR** ondansetron      Intake/Output Summary (Last 24 hours) at 10/3/2020 1347  Last data filed at 10/3/2020 1322  Gross per 24 hour   Intake 2572.68 ml   Output 400 ml   Net 2172.68 ml Diet:  DIET CLEAR LIQUID;    Exam:  BP (!) 101/51   Pulse 77   Temp 98.6 °F (37 °C) (Oral)   Resp 18   Ht 5' 4\" (1.626 m)   Wt 199 lb (90.3 kg)   SpO2 99%   BMI 34.16 kg/m²   General appearance: ill-appearing, no apparent distress, appears stated age and cooperative. HEENT: Pupils equal, round, and reactive to light. Conjunctivae/corneas clear. Neck: Supple, with full range of motion. No jugular venous distention. Trachea midline. Respiratory:  Normal respiratory effort. Clear to auscultation, bilaterally without Rales/Wheezes/Rhonchi. Cardiovascular: Regular rate and rhythm with normal S1/S2 without murmurs, rubs or gallops. Abdomen: Soft, non-tender, non-distended with normal bowel sounds. Musculoskeletal: passive and active ROM x 4 extremities. Skin: Skin color pale, texture, turgor normal.  No rashes or lesions. Neurologic:  Neurovascularly intact without any focal sensory/motor deficits. Cranial nerves: II-XII intact, grossly non-focal.  Psychiatric: Alert and oriented, thought content appropriate, normal insight  Capillary Refill: Brisk,< 3 seconds   Peripheral Pulses: +2 palpable, equal bilaterally     Labs:   Recent Labs     10/02/20  1657 10/03/20  0655   WBC 19.8* 21.4*   HGB 10.8* 9.3*   HCT 33.3* 29.3*    334     Recent Labs     10/02/20  1657 10/03/20  0655    139   K 2.9* 3.0*    105   CO2 22* 23   BUN 10 10   CREATININE 1.7* 1.3*   CALCIUM 10.0 9.3     Recent Labs     10/02/20  1657   AST 19   ALT 21   BILIDIR <0.2   BILITOT 0.5   ALKPHOS 92     No results for input(s): INR in the last 72 hours. No results for input(s): Jojo Lowers in the last 72 hours.     Microbiology:    Blood culture #1: No results found for: BC    Blood culture #2:No results found for: Rae Miner    Organism:No results found for: ORG    No results found for: LABGRAM    MRSA culture only:No results found for: [address] [place] [address]    Urine culture: No results found for: LABURIN    Respiratory culture: No results found for: CULTRESP    Aerobic and Anaerobic :  No results found for: LABAERO  No results found for: LABANAE    Urinalysis:    No results found for: Molly Sawyer, BACTERIA, RBCUA, BLOODU, SPECGRAV, GLUCOSEU    Radiology:  US RENAL COMPLETE   Final Result   1. The abnormal soft tissue density posterior to the left kidney and involving the left psoas muscle seen on recent CT scan was not demonstrated on today's renal ultrasound. Findings on CT are suspicious for hematoma. Clinical correlation recommended. Follow-up CT of the abdomen or MRI of the abdomen would be helpful for further evaluation. 2. 2 cm benign-appearing left renal cyst. Findings suggestive of medical renal disease. **This report has been created using voice recognition software. It may contain minor errors which are inherent in voice recognition technology. **      Final report electronically signed by Dr. Silvestre Or on 10/3/2020 8:30 AM      CT ABDOMEN PELVIS W IV CONTRAST Additional Contrast? None   Final Result   Complex structure extending out of the left kidney could relate to a hematoma as there is no definite gas within it. It will need to be followed. No definite bowel lesion. Small right adrenal nodule. **This report has been created using voice recognition software. It may contain minor errors which are inherent in voice recognition technology. **      Final report electronically signed by Dr. Shaneka Hurley on 10/2/2020 6:44 PM        Us Renal Complete    Result Date: 10/3/2020  BILATERAL RENAL ULTRASOUND: CLINICAL INFORMATION: Hematoma or other complex mass seen on left kidney on CT COMPARISON: No comparison available. TECHNIQUE: Multiple sonographic images of both kidneys were obtained. Images of the urinary bladder were also obtained.  FINDINGS: RIGHT KIDNEY - 11.6 x 5.1 x 4.7 cm Resistive Index - 0.7 Cortical Thickness - 1.0 cm LEFT KIDNEY - 11.5 x 5.8 x 5.3 cm Resistive Index - 1.5 Cortical Thickness - 0.7 cm URINARY BLADDER Pre-Void - 36.7 mL Post-Void - Patient refused to void  KIDNEYS:  Right kidney normal in appearance with a normal renal parenchymal component. Left kidney contains a cyst but is otherwise unremarkable. The abnormal area seen on recent CT of the abdomen, posterior to left kidney and involving the left psoas muscle, was not demonstrated on today's renal ultrasound. Elevated resistive indices, suggestive of possible medical renal disease. MASS/CYST: 2 cm benign-appearing exophytic cyst inferior aspect left kidney. HYDRONEPHROSIS:  There is no hydronephrosis. CALCULI:  No calculi are seen. BLADDER:  The urinary bladder is unremarkable. .     1. The abnormal soft tissue density posterior to the left kidney and involving the left psoas muscle seen on recent CT scan was not demonstrated on today's renal ultrasound. Findings on CT are suspicious for hematoma. Clinical correlation recommended. Follow-up CT of the abdomen or MRI of the abdomen would be helpful for further evaluation. 2. 2 cm benign-appearing left renal cyst. Findings suggestive of medical renal disease. **This report has been created using voice recognition software. It may contain minor errors which are inherent in voice recognition technology. ** Final report electronically signed by Dr. Molly Loredo on 10/3/2020 8:30 AM    Ct Abdomen Pelvis W Iv Contrast Additional Contrast? None    Result Date: 10/2/2020  PROCEDURE: CT ABDOMEN PELVIS W IV CONTRAST CLINICAL INFORMATION: RLQ abdominal pain, anorexia, looking for ileus/obstructions . Diarrhea COMPARISON: Lumbar MRI 7/31/2020 TECHNIQUE: 5 mm axial CT images were obtained through the abdomen and pelvis after the administration of intravenous and oral contrast. Coronal and sagittal reconstructions were obtained.  All CT scans at this facility use dose modulation, iterative reconstruction, and/or weight-based dosing when appropriate to reduce radiation dose to as low as reasonably achievable. FINDINGS Lung base: 2 mm right lower lobe nodule. Liver and spleen: homogeneous attenuation, no masses seen. Biliary: normal; no calculi. Pancreas: head, body, and tail unremarkable. Adrenals: 14 mm x 11 mm right adrenal nodule is nonspecific. Kidneys: 2.7 cm low-attenuation focus left kidney suspicious for cyst. Near this is perinephric heterogeneous low-attenuation collection measuring up to 6.3 cm. It involves the adjacent psoas muscle. No definite gas within it though abscess is a consideration. Hematoma is possible. It was not present on previous MRI. No hydronephrosis. Tiny simple appearing cysts suggested on the right. Aorta: normal caliber. Lymph Nodes: normal size. Bowel: Normal caliber. No evidence of obstruction. Questionable thickening of the distal esophagus. Bladder: Normal Reproductive: 3.2 cm x 2.6 cm left hypogastric soft tissue opacity. This may relate to the left ovary as there is a 8 mm associated cystic area. Uterus and right ovary may have been removed. Bones: normal for age. Complex structure extending out of the left kidney could relate to a hematoma as there is no definite gas within it. It will need to be followed. No definite bowel lesion. Small right adrenal nodule. **This report has been created using voice recognition software. It may contain minor errors which are inherent in voice recognition technology. ** Final report electronically signed by Dr. Douglas Foster on 10/2/2020 6:44 PM      Electronically signed by Katie Olivera PA-C on 10/3/2020 at 1:47 PM

## 2020-10-03 NOTE — CARE COORDINATION
After evaluating patient, patient states that she has been seeing Dr. Richar Urena and has a scheduled EGD with him Tuesday 10/6/20. The nurse was notified of this and was instructed to consult Dr. Richar Urena as she is his established patient.

## 2020-10-03 NOTE — PROGRESS NOTES
Pt admitted to  5K18 via from ED and via cart/stretcher from ED. Complaints: EYAL IV normal saline infusing into the antecubital left, condition patent and no redness at a rate of 100 mls/ hour with about 800 mls in the bag still. IV site free of s/s of infection or infiltration. Vital signs obtained. Assessment and data collection initiated. Two nurse skin assessment performed by Carlo Vaughan and Itz Flynn RN. Oriented to room. Policies and procedures for 5 explained. All questions answered with no further questions at this time. Fall prevention and safety brochure discussed with patient. Bed alarm on. Call light in reach. Oriented to room. Elsa Sepulveda RN 10/2/2020 9:05 PM     Explained patients right to have family, representative or physician notified of their admission. Patient has Requested for physician to be notified. Patient has Declined for family/representative to be notified.

## 2020-10-04 LAB
ANION GAP SERPL CALCULATED.3IONS-SCNC: 11 MEQ/L (ref 8–16)
BUN BLDV-MCNC: 8 MG/DL (ref 7–22)
CALCIUM SERPL-MCNC: 8.8 MG/DL (ref 8.5–10.5)
CHLORIDE BLD-SCNC: 108 MEQ/L (ref 98–111)
CO2: 20 MEQ/L (ref 23–33)
CREAT SERPL-MCNC: 0.9 MG/DL (ref 0.4–1.2)
ERYTHROCYTE [DISTWIDTH] IN BLOOD BY AUTOMATED COUNT: 12.8 % (ref 11.5–14.5)
ERYTHROCYTE [DISTWIDTH] IN BLOOD BY AUTOMATED COUNT: 43.6 FL (ref 35–45)
GFR SERPL CREATININE-BSD FRML MDRD: 62 ML/MIN/1.73M2
GLUCOSE BLD-MCNC: 114 MG/DL (ref 70–108)
HCT VFR BLD CALC: 27.7 % (ref 37–47)
HEMOGLOBIN: 8.9 GM/DL (ref 12–16)
MCH RBC QN AUTO: 29.8 PG (ref 26–33)
MCHC RBC AUTO-ENTMCNC: 32.1 GM/DL (ref 32.2–35.5)
MCV RBC AUTO: 92.6 FL (ref 81–99)
PLATELET # BLD: 299 THOU/MM3 (ref 130–400)
PMV BLD AUTO: 9.7 FL (ref 9.4–12.4)
POTASSIUM SERPL-SCNC: 3.3 MEQ/L (ref 3.5–5.2)
RBC # BLD: 2.99 MILL/MM3 (ref 4.2–5.4)
SODIUM BLD-SCNC: 139 MEQ/L (ref 135–145)
WBC # BLD: 15 THOU/MM3 (ref 4.8–10.8)

## 2020-10-04 PROCEDURE — 6370000000 HC RX 637 (ALT 250 FOR IP): Performed by: STUDENT IN AN ORGANIZED HEALTH CARE EDUCATION/TRAINING PROGRAM

## 2020-10-04 PROCEDURE — 96361 HYDRATE IV INFUSION ADD-ON: CPT

## 2020-10-04 PROCEDURE — 85027 COMPLETE CBC AUTOMATED: CPT

## 2020-10-04 PROCEDURE — 0DB88ZX EXCISION OF SMALL INTESTINE, VIA NATURAL OR ARTIFICIAL OPENING ENDOSCOPIC, DIAGNOSTIC: ICD-10-PCS | Performed by: INTERNAL MEDICINE

## 2020-10-04 PROCEDURE — 99152 MOD SED SAME PHYS/QHP 5/>YRS: CPT | Performed by: INTERNAL MEDICINE

## 2020-10-04 PROCEDURE — 36415 COLL VENOUS BLD VENIPUNCTURE: CPT

## 2020-10-04 PROCEDURE — 99232 SBSQ HOSP IP/OBS MODERATE 35: CPT | Performed by: PHYSICIAN ASSISTANT

## 2020-10-04 PROCEDURE — 80048 BASIC METABOLIC PNL TOTAL CA: CPT

## 2020-10-04 PROCEDURE — 99153 MOD SED SAME PHYS/QHP EA: CPT | Performed by: INTERNAL MEDICINE

## 2020-10-04 PROCEDURE — 1200000003 HC TELEMETRY R&B

## 2020-10-04 PROCEDURE — 3609012400 HC EGD TRANSORAL BIOPSY SINGLE/MULTIPLE: Performed by: INTERNAL MEDICINE

## 2020-10-04 PROCEDURE — 96376 TX/PRO/DX INJ SAME DRUG ADON: CPT

## 2020-10-04 PROCEDURE — 88305 TISSUE EXAM BY PATHOLOGIST: CPT

## 2020-10-04 PROCEDURE — 87506 IADNA-DNA/RNA PROBE TQ 6-11: CPT

## 2020-10-04 PROCEDURE — 2580000003 HC RX 258: Performed by: STUDENT IN AN ORGANIZED HEALTH CARE EDUCATION/TRAINING PROGRAM

## 2020-10-04 PROCEDURE — 93010 ELECTROCARDIOGRAM REPORT: CPT | Performed by: INTERNAL MEDICINE

## 2020-10-04 PROCEDURE — 6360000002 HC RX W HCPCS: Performed by: INTERNAL MEDICINE

## 2020-10-04 PROCEDURE — 6360000002 HC RX W HCPCS: Performed by: PHYSICIAN ASSISTANT

## 2020-10-04 PROCEDURE — 6370000000 HC RX 637 (ALT 250 FOR IP): Performed by: INTERNAL MEDICINE

## 2020-10-04 RX ORDER — POTASSIUM CHLORIDE 7.45 MG/ML
10 INJECTION INTRAVENOUS
Status: COMPLETED | OUTPATIENT
Start: 2020-10-04 | End: 2020-10-04

## 2020-10-04 RX ORDER — MIDAZOLAM HYDROCHLORIDE 1 MG/ML
INJECTION INTRAMUSCULAR; INTRAVENOUS PRN
Status: DISCONTINUED | OUTPATIENT
Start: 2020-10-04 | End: 2020-10-04 | Stop reason: ALTCHOICE

## 2020-10-04 RX ORDER — POLYETHYLENE GLYCOL 3350 17 G/17G
17 POWDER, FOR SOLUTION ORAL
Status: DISCONTINUED | OUTPATIENT
Start: 2020-10-04 | End: 2020-10-05

## 2020-10-04 RX ORDER — FENTANYL CITRATE 50 UG/ML
INJECTION, SOLUTION INTRAMUSCULAR; INTRAVENOUS PRN
Status: DISCONTINUED | OUTPATIENT
Start: 2020-10-04 | End: 2020-10-04 | Stop reason: ALTCHOICE

## 2020-10-04 RX ORDER — SENNA PLUS 8.6 MG/1
2 TABLET ORAL ONCE
Status: COMPLETED | OUTPATIENT
Start: 2020-10-04 | End: 2020-10-04

## 2020-10-04 RX ORDER — MIDAZOLAM HYDROCHLORIDE 1 MG/ML
INJECTION INTRAMUSCULAR; INTRAVENOUS
Status: DISPENSED
Start: 2020-10-04 | End: 2020-10-04

## 2020-10-04 RX ORDER — FENTANYL CITRATE 50 UG/ML
INJECTION, SOLUTION INTRAMUSCULAR; INTRAVENOUS
Status: DISPENSED
Start: 2020-10-04 | End: 2020-10-04

## 2020-10-04 RX ADMIN — POLYETHYLENE GLYCOL 3350 17 G: 17 POWDER, FOR SOLUTION ORAL at 13:57

## 2020-10-04 RX ADMIN — SODIUM CHLORIDE: 9 INJECTION, SOLUTION INTRAVENOUS at 03:34

## 2020-10-04 RX ADMIN — SENNOSIDES 17.2 MG: 8.6 TABLET, FILM COATED ORAL at 13:55

## 2020-10-04 RX ADMIN — POTASSIUM CHLORIDE 10 MEQ: 7.46 INJECTION, SOLUTION INTRAVENOUS at 15:46

## 2020-10-04 RX ADMIN — ACETAMINOPHEN 650 MG: 325 TABLET ORAL at 03:43

## 2020-10-04 RX ADMIN — POLYETHYLENE GLYCOL 3350 17 G: 17 POWDER, FOR SOLUTION ORAL at 15:46

## 2020-10-04 RX ADMIN — POTASSIUM CHLORIDE 10 MEQ: 7.46 INJECTION, SOLUTION INTRAVENOUS at 03:34

## 2020-10-04 RX ADMIN — BISACODYL 10 MG: 5 TABLET, COATED ORAL at 13:55

## 2020-10-04 RX ADMIN — PAROXETINE HYDROCHLORIDE 20 MG: 20 TABLET, FILM COATED ORAL at 20:46

## 2020-10-04 RX ADMIN — POTASSIUM CHLORIDE 10 MEQ: 7.46 INJECTION, SOLUTION INTRAVENOUS at 18:08

## 2020-10-04 RX ADMIN — POTASSIUM CHLORIDE 10 MEQ: 7.46 INJECTION, SOLUTION INTRAVENOUS at 20:47

## 2020-10-04 RX ADMIN — POTASSIUM CHLORIDE 10 MEQ: 7.46 INJECTION, SOLUTION INTRAVENOUS at 13:47

## 2020-10-04 ASSESSMENT — PAIN SCALES - GENERAL
PAINLEVEL_OUTOF10: 0
PAINLEVEL_OUTOF10: 7
PAINLEVEL_OUTOF10: 0

## 2020-10-04 ASSESSMENT — PAIN - FUNCTIONAL ASSESSMENT: PAIN_FUNCTIONAL_ASSESSMENT: 0-10

## 2020-10-04 NOTE — PROGRESS NOTES
Oleg Rachel is here egd. Scope used . Pictures taken. Cold forceps biopsies taken and placed in 1 jar. Procedure completed and she tolerated well.

## 2020-10-04 NOTE — PROGRESS NOTES
Hospitalist Progress Note      Patient:  Asia Meade    Unit/Bed:5K-18/018-A  YOB: 1950  MRN: [id number]   Acct: [de-identified]   PCP: Juan Francisco De La Rosa MD  Date of Admission: 10/2/2020    Assessment/Plan:    1. Hypokalemia: likely related to GI losses/ reduced oral intake. K 2.9 on admission, now 3.0. Replete per protocol and continue to monitor. EKG showed sinus tachycardia and ST & T wave abnormality    10/4-> replete per protocol, 3.3. Recheck in am   2. Vomiting / diarrhea / reduced appetite: requests care with Dr. Anup Jarquin, pt apparently already has scheduled EGD with him 10/6/20. Will send GI panel for stool. Antiemetics as needed. Will allow clear liquid diet pending GI consultation. CEA wnl. 10/4-> EGD today  3. EYAL on ?probable CKD, resolved: likely pre-renal given above presentation. Cr 1.7 on admission down to 1.3. Renal US showed findings suggestive of medical renal disease. Avoid nephrotoxic agents, continue with IVFs for now. 4. Acute normocytic anemia: last Hgb 1/2019 14.8, now 10.8 on admission and down to 9.3. Suspect some dilutional component with IVFs. Iron studies show low iron. Increased Vit B12. No gross bleeding noted. Will obtain FOBT. Transfuse for Hgb < 7 or hemodynamic instability. 5. Possible hematoma of left kidney: incidental finding on CT A/P. Will need to follow. Renal US did not exhibit this finding. However, suggest follow up CT of abd or MRI of abd for further evaluation. Defer to GI, consulted. 6. 2 cm benign- appearing left renal cyst: noted on Renal US, aware  7. Adrenal nodule: noted on CT A/P, may require further workup pending GI evaluation  8. Essential HTN, stable: BP has been on low-normal side. Takes Avapro, Norvasc, and Apresoline which were not reordered. Monitor and resume as appropriate. 9. Arthritis: hx of such, bilateral hips  10.  Obesity: BMI 34.16    Chief Complaint: EYAL    Initial H and P:-    \"69 y.o. female who presented to [facility] with nausea, vomiting, chills, shaking, and lightheadedness following a lab draw earlier today. She has been having decreased appetite for the last 4 weeks, which she initially thought was due to food poisoning. It initially started with nausea and vomiting. She has barely eaten anything for the last 4 weeks, and mostly drinks water or Gatorade. She has been having increased fatigue and weakness.     Patient does not know her family history because she is adopted.     She has never had a colonoscopy, but has not noticed any changes in her bowel habits. She denies dark or tarry stools. She does have diarrhea frequently for the last 2 weeks.     She denies chest pain or shortness of breath, including with exertion. \"    10/3- pt very upset about not being able to drink. Pt denies appetite but states she has strong thirst. Denies fever, reports chills upon waking. Notes she has increased fatigue/weakness over the past 4-5 weeks, denies associated dizziness. Denies CP/SOB. No abd pain, reports intermittent nausea with some emesis. BMs have been diarrheal in nature for the past 4-5 weeks, but pt notes that it is not voluminous. Subjective (past 24 hours):   10/4-> pt doing okay, continues to complain of no appetite. Some nausea with emesis yesterday. No fever/chills/CP/SOB or diarrhea. Past medical history, family history, social history and allergies reviewed again and is unchanged since admission. ROS (12 point review of systems completed. Pertinent positives noted.  Otherwise ROS is negative)     Medications:  Reviewed    Infusion Medications    sodium chloride 75 mL/hr at 10/04/20 0334     Scheduled Medications    midazolam        fentaNYL        polyethylene glycol  17 g Oral Q1H    potassium chloride  10 mEq Intravenous Q2H    potassium chloride  20 mEq Oral BID WC    pantoprazole  40 mg Oral Daily    PARoxetine  20 mg Oral QAM    sodium chloride flush  10 mL Intravenous 2 times per day     PRN Meds: loperamide, potassium chloride **OR** potassium alternative oral replacement **OR** potassium chloride, sodium chloride flush, acetaminophen **OR** acetaminophen, polyethylene glycol, promethazine **OR** ondansetron      Intake/Output Summary (Last 24 hours) at 10/4/2020 1648  Last data filed at 10/4/2020 1646  Gross per 24 hour   Intake 3160.81 ml   Output 800 ml   Net 2360.81 ml       Diet:  DIET CLEAR LIQUID;    Exam:  BP (!) 164/70   Pulse 86   Temp 98 °F (36.7 °C) (Oral)   Resp 18   Ht 5' 4\" (1.626 m)   Wt 199 lb (90.3 kg)   SpO2 98%   BMI 34.16 kg/m²   General appearance: ill-appearing, no apparent distress, appears stated age and cooperative. HEENT: Pupils equal, round, and reactive to light. Conjunctivae/corneas clear. Neck: Supple, with full range of motion. No jugular venous distention. Trachea midline. Respiratory:  Normal respiratory effort. Clear to auscultation, bilaterally without Rales/Wheezes/Rhonchi. Cardiovascular: Regular rate and rhythm with normal S1/S2 without murmurs, rubs or gallops. Abdomen: Soft, non-tender, non-distended with normal bowel sounds. Musculoskeletal: passive and active ROM x 4 extremities. Skin: Skin color pale, texture, turgor normal.  No rashes or lesions. Neurologic:  Neurovascularly intact without any focal sensory/motor deficits.  Cranial nerves: II-XII intact, grossly non-focal.  Psychiatric: Alert and oriented, thought content appropriate, normal insight  Capillary Refill: Brisk,< 3 seconds   Peripheral Pulses: +2 palpable, equal bilaterally     Labs:   Recent Labs     10/02/20  1657 10/03/20  0655 10/04/20  0601   WBC 19.8* 21.4* 15.0*   HGB 10.8* 9.3* 8.9*   HCT 33.3* 29.3* 27.7*    334 299     Recent Labs     10/02/20  1657 10/03/20  0655 10/04/20  0601    139 139   K 2.9* 3.0* 3.3*    105 108   CO2 22* 23 20*   BUN 10 10 8   CREATININE 1.7* 1.3* 0.9 CALCIUM 10.0 9.3 8.8     Recent Labs     10/02/20  1657   AST 19   ALT 21   BILIDIR <0.2   BILITOT 0.5   ALKPHOS 92     No results for input(s): INR in the last 72 hours. No results for input(s): Margette Dec in the last 72 hours. Microbiology:    Blood culture #1: No results found for: BC    Blood culture #2:No results found for: Ghanshyam Lav    Organism:No results found for: ORG    No results found for: LABGRAM    MRSA culture only:No results found for: [address]     Urine culture: No results found for: LABURIN    Respiratory culture: No results found for: CULTRESP    Aerobic and Anaerobic :  No results found for: LABAERO  No results found for: LABANAE    Urinalysis:    No results found for: Kelli Harada, BACTERIA, RBCUA, BLOODU, SPECGRAV, GLUCOSEU    Radiology:  US RENAL COMPLETE   Final Result   1. The abnormal soft tissue density posterior to the left kidney and involving the left psoas muscle seen on recent CT scan was not demonstrated on today's renal ultrasound. Findings on CT are suspicious for hematoma. Clinical correlation recommended. Follow-up CT of the abdomen or MRI of the abdomen would be helpful for further evaluation. 2. 2 cm benign-appearing left renal cyst. Findings suggestive of medical renal disease. **This report has been created using voice recognition software. It may contain minor errors which are inherent in voice recognition technology. **      Final report electronically signed by Dr. River Cassidy on 10/3/2020 8:30 AM      CT ABDOMEN PELVIS W IV CONTRAST Additional Contrast? None   Final Result   Complex structure extending out of the left kidney could relate to a hematoma as there is no definite gas within it. It will need to be followed. No definite bowel lesion. Small right adrenal nodule. **This report has been created using voice recognition software. It may contain minor errors which are inherent in voice recognition technology. **      Final report electronically signed by Dr. Sanjuanita Ramos on 10/2/2020 6:44 PM        Us Renal Complete    Result Date: 10/3/2020  BILATERAL RENAL ULTRASOUND: CLINICAL INFORMATION: Hematoma or other complex mass seen on left kidney on CT COMPARISON: No comparison available. TECHNIQUE: Multiple sonographic images of both kidneys were obtained. Images of the urinary bladder were also obtained. FINDINGS: RIGHT KIDNEY - 11.6 x 5.1 x 4.7 cm Resistive Index - 0.7 Cortical Thickness - 1.0 cm LEFT KIDNEY - 11.5 x 5.8 x 5.3 cm Resistive Index - 1.5 Cortical Thickness - 0.7 cm URINARY BLADDER Pre-Void - 36.7 mL Post-Void - Patient refused to void  KIDNEYS:  Right kidney normal in appearance with a normal renal parenchymal component. Left kidney contains a cyst but is otherwise unremarkable. The abnormal area seen on recent CT of the abdomen, posterior to left kidney and involving the left psoas muscle, was not demonstrated on today's renal ultrasound. Elevated resistive indices, suggestive of possible medical renal disease. MASS/CYST: 2 cm benign-appearing exophytic cyst inferior aspect left kidney. HYDRONEPHROSIS:  There is no hydronephrosis. CALCULI:  No calculi are seen. BLADDER:  The urinary bladder is unremarkable. .     1. The abnormal soft tissue density posterior to the left kidney and involving the left psoas muscle seen on recent CT scan was not demonstrated on today's renal ultrasound. Findings on CT are suspicious for hematoma. Clinical correlation recommended. Follow-up CT of the abdomen or MRI of the abdomen would be helpful for further evaluation. 2. 2 cm benign-appearing left renal cyst. Findings suggestive of medical renal disease. **This report has been created using voice recognition software. It may contain minor errors which are inherent in voice recognition technology. ** Final report electronically signed by Dr. River Cassidy on 10/3/2020 8:30 AM    Ct Abdomen Pelvis W Iv Contrast Additional Contrast? None    Result Date: 10/2/2020  PROCEDURE: CT ABDOMEN PELVIS W IV CONTRAST CLINICAL INFORMATION: RLQ abdominal pain, anorexia, looking for ileus/obstructions . Diarrhea COMPARISON: Lumbar MRI 7/31/2020 TECHNIQUE: 5 mm axial CT images were obtained through the abdomen and pelvis after the administration of intravenous and oral contrast. Coronal and sagittal reconstructions were obtained. All CT scans at this facility use dose modulation, iterative reconstruction, and/or weight-based dosing when appropriate to reduce radiation dose to as low as reasonably achievable. FINDINGS Lung base: 2 mm right lower lobe nodule. Liver and spleen: homogeneous attenuation, no masses seen. Biliary: normal; no calculi. Pancreas: head, body, and tail unremarkable. Adrenals: 14 mm x 11 mm right adrenal nodule is nonspecific. Kidneys: 2.7 cm low-attenuation focus left kidney suspicious for cyst. Near this is perinephric heterogeneous low-attenuation collection measuring up to 6.3 cm. It involves the adjacent psoas muscle. No definite gas within it though abscess is a consideration. Hematoma is possible. It was not present on previous MRI. No hydronephrosis. Tiny simple appearing cysts suggested on the right. Aorta: normal caliber. Lymph Nodes: normal size. Bowel: Normal caliber. No evidence of obstruction. Questionable thickening of the distal esophagus. Bladder: Normal Reproductive: 3.2 cm x 2.6 cm left hypogastric soft tissue opacity. This may relate to the left ovary as there is a 8 mm associated cystic area. Uterus and right ovary may have been removed. Bones: normal for age. Complex structure extending out of the left kidney could relate to a hematoma as there is no definite gas within it. It will need to be followed. No definite bowel lesion. Small right adrenal nodule. **This report has been created using voice recognition software. It may contain minor errors which are inherent in voice recognition technology. ** Final report electronically signed by Dr. Genaro Canavan on 10/2/2020 6:44 PM      Electronically signed by Gabriel Shields PA-C on 10/4/2020 at 4:48 PM

## 2020-10-04 NOTE — BRIEF OP NOTE
Brief Postoperative Note      Patient: Veronica Bolanos  YOB: 1950  MRN: [id number]    Date of Procedure: 10/4/2020    Pre-Op Diagnosis: vomitting, diarrhea, reduced appetite    Post-Op Diagnosis: normal egd       Procedure(s):  EGD BIOPSY    Surgeon(s):  Enriqueta Bobby MD    Assistant:  * No surgical staff found *    Anesthesia: IV Sedation    Estimated Blood Loss (mL): Minimal    Complications: None    Specimens:   ID Type Source Tests Collected by Time Destination   A : small bowel biopsies, rule out malabsorption  Tissue Duodenum SURGICAL PATHOLOGY Enriqueta Bobby MD 10/4/2020 1101        Implants:  * No implants in log *      Drains: * No LDAs found *    Findings: normal egd, biopsies from small bowel .     Electronically signed by Enriqueta Bobby MD on 10/4/2020 at 11:11 AM

## 2020-10-04 NOTE — PLAN OF CARE
Problem: Falls - Risk of:  Goal: Will remain free from falls  Description: Will remain free from falls  Outcome: Ongoing     Patient remains free from fall this shift. Bed alarm activated . Bed locked and lowest position. 2/4 side rails raised for safety. Patient has non-skid socks when ambulating. Pathway clear and possessions in reach. Call light within reach. Patient rounded on hourly. Problem: GI  Goal: No bowel complications  Outcome: Ongoing   Bowel Sounds remain hypoactive. No new changes or complaints.       Problem: Discharge Planning:  Goal: Discharged to appropriate level of care  Description: Discharged to appropriate level of care  Outcome: Ongoing     Will be discharged to private residence

## 2020-10-04 NOTE — PROGRESS NOTES
Tanyaosmar Tejada is in phase 2  Vitals as charted  She denies pain  Daughter at bedside  Dr. Thuy Galvan updated her and daughter.    Attempted to call Report to Melissa Rosario RN

## 2020-10-04 NOTE — CONSULTS
[address] [id number]                                  CONSULTATION    PATIENT NAME: Jaswinder Wilsno                 :        1950  MED REC NO:   [id number]                           ROOM:       [id number]  ACCOUNT NO:   [de-identified]                           ADMIT DATE: 10/02/2020  PROVIDER:     SALVADOR Benitez Samantha:  10/04/2020    REASON FOR CONSULT:  For further evaluation of nausea, vomiting, severe  anemia and diarrhea. HISTORY OF PRESENT ILLNESS:  The patient is a 45-year-old pleasant  female who has past medical history of arthritis, hyperlipidemia,  hypertension who presented to the [facility] Emergency Room with  nausea, vomiting, chills, shaking, lightheadedness, following the blood  draw earlier today. The patient was evaluated by me in our office two  days ago. At that time, the patient was scheduled to undergo  esophagogastroduodenoscopy and colonoscopy for further evaluation of  severe anemia. The patient also complains of loose bowel movements. She denied any bright red blood per rectum or melena. Complains of  nausea, vomiting. The patient is not eating. States that her appetite  is not good, most of that time she is drinking fluids and the patient  had hemoglobin of 13.4 as per Dr. Angela Ruiz. Dr. Angela Ruiz called my office  stating that her hemoglobin couple of months ago was 13.4 dropped down  to 9.4. As per Dr. Crecencio Eisenmenger request, I evaluated the patient and I set  her for EGD, colonoscopy on 10/06/2020. In the meantime, the patient  went for blood draw and she was having shaking, lightheaded, dizzy and  also nausea, vomiting. For that reason, she was brought to the  emergency room. The patient had blood workup in the emergency room  showed WBC 19.8, hemoglobin 10.8, hematocrit 33.3, platelet count  514,236. Sodium 140, potassium 2.9, chloride 103, CO2 of 22, BUN 10,  creatinine 1.7.   AST 19, ALT 21, total bilirubin 0.5, alkaline  phosphatase 92. CT scan of the abdomen and pelvis was reported by the  radiologist as complex structure extending out of the left kidney, could  relate to the hematoma, no definitive gas with any treatment, need to be  followed. No definite bowel lesions, small right adrenal nodule. Subsequently, the patient had ultrasound, did not confirm any hematoma. The patient is admitted to the hospital and her potassium is corrected. She is undergoing further evaluation. PAST MEDICAL HISTORY:  Arthritis, hyperlipidemia, hypertension. PAST SURGICAL HISTORY:  Bilateral carpal tunnel release,  cholecystectomy, hysterectomy, sinus surgery. ADMITTING MEDICATIONS:  Irbesartan (Avapro) 300 mg by mouth at night,  potassium chloride 10 mEq intravenously once, amlodipine 10 mg by mouth  daily, meloxicam 15 mg by mouth daily, hydralazine 50 mg by mouth three  times daily, pantoprazole 40 mg by mouth daily, paroxetine 20 mg by  mouth in the morning. ALLERGIES:  CYMBALTA. SOCIAL HISTORY:  The patient lives at home. No tobacco.  No alcohol. No illicit drug use. FAMILY HISTORY:   No family history of any GI related problems. REVIEW OF SYSTEMS:  A 14-point review of systems was reviewed. Pertinent positives were mentioned in HPI and past medical history. PHYSICAL EXAMINATION:  VITAL SIGNS:  Weight 199 pounds, height 5 feet 4 inches, blood pressure  116/57, pulse 78, temperature 97.9, BMI 34.16 kg/m2. GENERAL:  A 77year-old pleasant female lying in bed, well-built,  appears to be in no acute distress. HEENT:  Normocephalic, atraumatic. Pupils are equal, round, and  reactive to light. Anicteric sclerae. Pale conjunctivae. No erythema  of oropharynx. NECK:  Supple. No JVD. No thyromegaly. CHEST:  No axillary or supraclavicular adenopathy. LUNGS:  Equal to expansion on inspection. [address] air entry bilaterally. ABDOMEN:  Soft. Normoactive bowel sounds. Nontender. No palpable  masses. No appreciable hernias. RECTAL:  Deferred. EXTREMITIES:  No clubbing, cyanosis or edema. SKIN:  Pale. NEUROLOGIC:  Generalized weakness. DIAGNOSTIC DATA:  As in HPI. Most recent blood work from today showed  creatinine 0.9. WBC 15, hemoglobin 8.9, hematocrit 27.7, platelet count  997,652. Ferritin 311, iron 9, iron saturation 6, TIBC 141, transferrin  136. IMPRESSION:  A 64-year-old pleasant female, has severe anemia,  normocytic anemia could be multifactorial; rule out peptic ulcer  disease; rule out any Crohn's disease or colitis. The patient never had  colonoscopy in the past.    RECOMMENDATIONS:  At this time is keep the patient nothing by mouth. Avoid any nonsteroidal anti-inflammatory drugs. Proceed with  esophagogastroduodenoscopy. I have discussed with the patient regarding  the EGD, its indications and complications including but not limited to  perforation, bleeding, infection, adverse reaction to medicine, very  slight chance of missing significant lesions. If EGD is negative,  consider colonoscopy in the morning. Thank you Katie Olivera and Dr. Bee Blount for letting me see this patient. Do not hesitate to call me if you have any questions. My  recommendations are above.         Beryl Marin M.D.    D: 10/04/2020 10:42:23       T: 10/04/2020 10:45:50     [email or web address]  Job#: [id number]     Doc#: [id number]    CC:  PAULY Hinton M.D.

## 2020-10-05 VITALS
DIASTOLIC BLOOD PRESSURE: 69 MMHG | WEIGHT: 199 LBS | SYSTOLIC BLOOD PRESSURE: 146 MMHG | HEIGHT: 64 IN | HEART RATE: 81 BPM | OXYGEN SATURATION: 97 % | RESPIRATION RATE: 18 BRPM | BODY MASS INDEX: 33.97 KG/M2 | TEMPERATURE: 97.9 F

## 2020-10-05 LAB
ANION GAP SERPL CALCULATED.3IONS-SCNC: 9 MEQ/L (ref 8–16)
BUN BLDV-MCNC: 7 MG/DL (ref 7–22)
CALCIUM SERPL-MCNC: 8.6 MG/DL (ref 8.5–10.5)
CHLORIDE BLD-SCNC: 102 MEQ/L (ref 98–111)
CO2: 21 MEQ/L (ref 23–33)
CREAT SERPL-MCNC: 0.8 MG/DL (ref 0.4–1.2)
ERYTHROCYTE [DISTWIDTH] IN BLOOD BY AUTOMATED COUNT: 12.8 % (ref 11.5–14.5)
ERYTHROCYTE [DISTWIDTH] IN BLOOD BY AUTOMATED COUNT: 43.8 FL (ref 35–45)
GFR SERPL CREATININE-BSD FRML MDRD: 71 ML/MIN/1.73M2
GLUCOSE BLD-MCNC: 96 MG/DL (ref 70–108)
HCT VFR BLD CALC: 27.9 % (ref 37–47)
HEMOGLOBIN: 9 GM/DL (ref 12–16)
MCH RBC QN AUTO: 30.1 PG (ref 26–33)
MCHC RBC AUTO-ENTMCNC: 32.3 GM/DL (ref 32.2–35.5)
MCV RBC AUTO: 93.3 FL (ref 81–99)
PLATELET # BLD: 282 THOU/MM3 (ref 130–400)
PMV BLD AUTO: 9.6 FL (ref 9.4–12.4)
POTASSIUM SERPL-SCNC: 3.5 MEQ/L (ref 3.5–5.2)
RBC # BLD: 2.99 MILL/MM3 (ref 4.2–5.4)
SODIUM BLD-SCNC: 132 MEQ/L (ref 135–145)
WBC # BLD: 13 THOU/MM3 (ref 4.8–10.8)

## 2020-10-05 PROCEDURE — 36415 COLL VENOUS BLD VENIPUNCTURE: CPT

## 2020-10-05 PROCEDURE — 6370000000 HC RX 637 (ALT 250 FOR IP): Performed by: NURSE PRACTITIONER

## 2020-10-05 PROCEDURE — 85027 COMPLETE CBC AUTOMATED: CPT

## 2020-10-05 PROCEDURE — 94760 N-INVAS EAR/PLS OXIMETRY 1: CPT

## 2020-10-05 PROCEDURE — 99239 HOSP IP/OBS DSCHRG MGMT >30: CPT | Performed by: PHYSICIAN ASSISTANT

## 2020-10-05 PROCEDURE — 6370000000 HC RX 637 (ALT 250 FOR IP): Performed by: PHYSICIAN ASSISTANT

## 2020-10-05 PROCEDURE — 80048 BASIC METABOLIC PNL TOTAL CA: CPT

## 2020-10-05 PROCEDURE — 6370000000 HC RX 637 (ALT 250 FOR IP): Performed by: STUDENT IN AN ORGANIZED HEALTH CARE EDUCATION/TRAINING PROGRAM

## 2020-10-05 RX ORDER — SENNA PLUS 8.6 MG/1
2 TABLET ORAL
Status: COMPLETED | OUTPATIENT
Start: 2020-10-05 | End: 2020-10-05

## 2020-10-05 RX ADMIN — SENNOSIDES 17.2 MG: 8.6 TABLET, FILM COATED ORAL at 12:15

## 2020-10-05 RX ADMIN — SENNOSIDES 17.2 MG: 8.6 TABLET, FILM COATED ORAL at 10:15

## 2020-10-05 RX ADMIN — SENNOSIDES 17.2 MG: 8.6 TABLET, FILM COATED ORAL at 14:04

## 2020-10-05 RX ADMIN — SENNOSIDES 17.2 MG: 8.6 TABLET, FILM COATED ORAL at 11:30

## 2020-10-05 RX ADMIN — POTASSIUM CHLORIDE 20 MEQ: 1500 TABLET, EXTENDED RELEASE ORAL at 07:54

## 2020-10-05 RX ADMIN — SENNOSIDES 17.2 MG: 8.6 TABLET, FILM COATED ORAL at 14:45

## 2020-10-05 RX ADMIN — PANTOPRAZOLE SODIUM 40 MG: 40 TABLET, DELAYED RELEASE ORAL at 06:01

## 2020-10-05 RX ADMIN — SENNOSIDES 17.2 MG: 8.6 TABLET, FILM COATED ORAL at 13:30

## 2020-10-05 RX ADMIN — BISACODYL 10 MG: 5 TABLET, COATED ORAL at 10:31

## 2020-10-05 ASSESSMENT — PAIN SCALES - GENERAL
PAINLEVEL_OUTOF10: 0
PAINLEVEL_OUTOF10: 0

## 2020-10-05 NOTE — DISCHARGE SUMMARY
Hospitalist Discharge Summary        Patient: Hollis Monsivais  YOB: 1950  MRN: [id number]   Acct: [de-identified]    Primary Care Physician: Shane Johnson MD    Admit date  10/2/2020    Discharge date:  10/5/2020 6:36 PM    Chief Complaint on presentation :-  Nausea & Vomiting     Discharge Assessment and Plan:-   Hypokalemia: Resolved. Potassium 3.5. Vomiting, diarrhea, nausea: EGD completed on 10/4, patient tolerated well. Patient refused to take oral prep and could not have colonoscopy on 10/5. GI recommended patient be discharged to have colonoscopy done on 10/6 in the office. Patient underwent \"pill prep \". EYAL: Resolved. Creatinine 1.7, 1.3, 0.9, 0.8. Anemia, normocytic, acute on chronic: Hemoglobin close to baseline around 9.  2 cm benign-appearing left renal cyst, adrenal nodule: Aware. Patient follow-up with PCP. Essential hypertension: Blood pressure has been labile. Continue home medications. OA: Bilateral hips. Obesity: BMI 34.16    Initial H and P and Hospital course:-  \"69 y. o. female who presented to [address] [place] [address] with nausea, vomiting, chills, shaking, and lightheadedness following a lab draw earlier today.  She has been having decreased appetite for the last 4 weeks, which she initially thought was due to food poisoning. Acey Craw initially started with nausea and vomiting.  She has barely eaten anything for the last 4 weeks, and mostly drinks water or Gatorade.  She has been having increased fatigue and weakness.     Patient does not know her family history because she is adopted.     She has never had a colonoscopy, but has not noticed any changes in her bowel habits.  She denies dark or tarry stools.  She does have diarrhea frequently for the last 2 weeks.     She denies chest pain or shortness of breath, including with exertion. \"     10/3- pt very upset about not being able to drink.  Pt denies appetite but states she has strong thirst. Denies fever, reports chills upon waking. Notes she has increased fatigue/weakness over the past 4-5 weeks, denies associated dizziness. Denies CP/SOB. No abd pain, reports intermittent nausea with some emesis. BMs have been diarrheal in nature for the past 4-5 weeks, but pt notes that it is not voluminous. 10/4-> pt doing okay, continues to complain of no appetite. Some nausea with emesis yesterday. No fever/chills/CP/SOB or diarrhea. Physical Exam:-  General appearance: ill-appearing, no apparent distress, appears stated age and cooperative. HEENT: Pupils equal, round, and reactive to light. Conjunctivae/corneas clear. Neck: Supple, with full range of motion. No jugular venous distention. Trachea midline. Respiratory:  Normal respiratory effort. Clear to auscultation, bilaterally without Rales/Wheezes/Rhonchi. Cardiovascular: Regular rate and rhythm with normal S1/S2 without murmurs, rubs or gallops. Abdomen: Soft, non-tender, non-distended with normal bowel sounds. Musculoskeletal: passive and active ROM x 4 extremities. Skin: Skin color pale, texture, turgor normal.  No rashes or lesions. Neurologic:  Neurovascularly intact without any focal sensory/motor deficits.  Cranial nerves: II-XII intact, grossly non-focal.  Psychiatric: Alert and oriented, thought content appropriate, normal insight  Capillary Refill: Brisk,< 3 seconds   Peripheral Pulses: +2 palpable, equal bilaterally     Vitals:   Patient Vitals for the past 24 hrs:   BP Temp Temp src Pulse Resp SpO2   10/05/20 1100 (!) 146/69 97.9 °F (36.6 °C) Oral 81 18 97 %   10/05/20 1041 -- -- -- -- -- 99 %   10/05/20 0800 135/71 98.2 °F (36.8 °C) Oral 87 16 96 %   10/05/20 0345 (!) 140/69 98.7 °F (37.1 °C) Oral 86 18 96 %   10/04/20 2330 133/73 98.8 °F (37.1 °C) Oral 88 18 95 %   10/04/20 2030 (!) 156/70 99.3 °F (37.4 °C) Oral 86 18 96 %     Weight:   Weight: 199 lb (90.3 kg)   24 hour intake/output:     Intake/Output Summary (Last 24 hours) at 10/5/2020 1836  Last data filed at 10/5/2020 1453  Gross per 24 hour   Intake 1964.6 ml   Output 100 ml   Net 1864.6 ml     Discharge Medications:-      Medication List      CONTINUE taking these medications    amLODIPine 10 MG tablet  Commonly known as:  NORVASC     hydrALAZINE 50 MG tablet  Commonly known as:  APRESOLINE     irbesartan 300 MG tablet  Commonly known as:  AVAPRO     meloxicam 15 MG tablet  Commonly known as:  MOBIC     pantoprazole 40 MG tablet  Commonly known as:  PROTONIX     PARoxetine 20 MG tablet  Commonly known as:  PAXIL        STOP taking these medications    potassium chloride 10 MEQ/100ML             Labs :-  Recent Results (from the past 72 hour(s))   CEA    Collection Time: 10/02/20 10:56 PM   Result Value Ref Range    CEA 1.5 0.0 - 5.0 ng/ml   Iron    Collection Time: 10/02/20 10:56 PM   Result Value Ref Range    Iron 9 (L) 50 - 170 ug/dL   Iron binding capacity    Collection Time: 10/02/20 10:56 PM   Result Value Ref Range    TIBC 141 (L) 171 - 450 ug/dL   IRON SATURATION    Collection Time: 10/02/20 10:56 PM   Result Value Ref Range    Iron Saturation 6 (L) 20 - 50 %   Transferrin    Collection Time: 10/02/20 10:56 PM   Result Value Ref Range    Transferrin 136 (L) 200 - 360 mg/dL   Ferritin    Collection Time: 10/02/20 10:56 PM   Result Value Ref Range    Ferritin 311 (H) 10 - 291 ng/mL   Basic Metabolic Panel w/ Reflex to MG    Collection Time: 10/03/20  6:55 AM   Result Value Ref Range    Sodium 139 135 - 145 meq/L    Potassium reflex Magnesium 3.0 (L) 3.5 - 5.2 meq/L    Chloride 105 98 - 111 meq/L    CO2 23 23 - 33 meq/L    Glucose 93 70 - 108 mg/dL    BUN 10 7 - 22 mg/dL    CREATININE 1.3 (H) 0.4 - 1.2 mg/dL    Calcium 9.3 8.5 - 10.5 mg/dL   CBC    Collection Time: 10/03/20  6:55 AM   Result Value Ref Range    WBC 21.4 (H) 4.8 - 10.8 thou/mm3    RBC 3.13 (L) 4.20 - 5.40 mill/mm3    Hemoglobin 9.3 (L) 12.0 - 16.0 gm/dl    Hematocrit 29.3 (L) 37.0 - 47.0 %    MCV 93.6 81.0 - 99.0 fL    MCH 29.7 26.0 - 33.0 pg    MCHC 31.7 (L) 32.2 - 35.5 gm/dl    RDW-CV 13.2 11.5 - 14.5 %    RDW-SD 45.0 35.0 - 45.0 fL    Platelets 401 086 - 507 thou/mm3    MPV 9.6 9.4 - 12.4 fL   Anion Gap    Collection Time: 10/03/20  6:55 AM   Result Value Ref Range    Anion Gap 11.0 8.0 - 16.0 meq/L   Glomerular Filtration Rate, Estimated    Collection Time: 10/03/20  6:55 AM   Result Value Ref Range    Est, Glom Filt Rate 41 (A) ml/min/1.73m2   Magnesium    Collection Time: 10/03/20  6:55 AM   Result Value Ref Range    Magnesium 1.8 1.6 - 2.4 mg/dL   Osmolality    Collection Time: 10/03/20  6:55 AM   Result Value Ref Range    Osmolality Calc 276.3 275.0 - 300.0 mOsmol/kg   CBC    Collection Time: 10/04/20  6:01 AM   Result Value Ref Range    WBC 15.0 (H) 4.8 - 10.8 thou/mm3    RBC 2.99 (L) 4.20 - 5.40 mill/mm3    Hemoglobin 8.9 (L) 12.0 - 16.0 gm/dl    Hematocrit 27.7 (L) 37.0 - 47.0 %    MCV 92.6 81.0 - 99.0 fL    MCH 29.8 26.0 - 33.0 pg    MCHC 32.1 (L) 32.2 - 35.5 gm/dl    RDW-CV 12.8 11.5 - 14.5 %    RDW-SD 43.6 35.0 - 45.0 fL    Platelets 644 592 - 291 thou/mm3    MPV 9.7 9.4 - 12.4 fL   Basic Metabolic Panel    Collection Time: 10/04/20  6:01 AM   Result Value Ref Range    Sodium 139 135 - 145 meq/L    Potassium 3.3 (L) 3.5 - 5.2 meq/L    Chloride 108 98 - 111 meq/L    CO2 20 (L) 23 - 33 meq/L    Glucose 114 (H) 70 - 108 mg/dL    BUN 8 7 - 22 mg/dL    CREATININE 0.9 0.4 - 1.2 mg/dL    Calcium 8.8 8.5 - 10.5 mg/dL   Anion Gap    Collection Time: 10/04/20  6:01 AM   Result Value Ref Range    Anion Gap 11.0 8.0 - 16.0 meq/L   Glomerular Filtration Rate, Estimated    Collection Time: 10/04/20  6:01 AM   Result Value Ref Range    Est, Glom Filt Rate 62 (A) ml/min/1.73m2   CBC    Collection Time: 10/05/20 12:49 AM   Result Value Ref Range    WBC 13.0 (H) 4.8 - 10.8 thou/mm3    RBC 2.99 (L) 4.20 - 5.40 mill/mm3    Hemoglobin 9.0 (L) 12.0 - 16.0 gm/dl    Hematocrit 27.9 (L) 37.0 - 47.0 %    MCV 93.3 81.0 - 99.0 fL    MCH 30.1 26.0 - 33.0 pg    MCHC 32.3 32.2 - 35.5 gm/dl    RDW-CV 12.8 11.5 - 14.5 %    RDW-SD 43.8 35.0 - 45.0 fL    Platelets 548 754 - 465 thou/mm3    MPV 9.6 9.4 - 12.4 fL   Basic Metabolic Panel    Collection Time: 10/05/20 12:49 AM   Result Value Ref Range    Sodium 132 (L) 135 - 145 meq/L    Potassium 3.5 3.5 - 5.2 meq/L    Chloride 102 98 - 111 meq/L    CO2 21 (L) 23 - 33 meq/L    Glucose 96 70 - 108 mg/dL    BUN 7 7 - 22 mg/dL    CREATININE 0.8 0.4 - 1.2 mg/dL    Calcium 8.6 8.5 - 10.5 mg/dL   Anion Gap    Collection Time: 10/05/20 12:49 AM   Result Value Ref Range    Anion Gap 9.0 8.0 - 16.0 meq/L   Glomerular Filtration Rate, Estimated    Collection Time: 10/05/20 12:49 AM   Result Value Ref Range    Est, Glom Filt Rate 71 (A) ml/min/1.73m2      Radiology:-  Us Renal Complete    Result Date: 10/3/2020  BILATERAL RENAL ULTRASOUND: CLINICAL INFORMATION: Hematoma or other complex mass seen on left kidney on CT COMPARISON: No comparison available. TECHNIQUE: Multiple sonographic images of both kidneys were obtained. Images of the urinary bladder were also obtained. FINDINGS: RIGHT KIDNEY - 11.6 x 5.1 x 4.7 cm Resistive Index - 0.7 Cortical Thickness - 1.0 cm LEFT KIDNEY - 11.5 x 5.8 x 5.3 cm Resistive Index - 1.5 Cortical Thickness - 0.7 cm URINARY BLADDER Pre-Void - 36.7 mL Post-Void - Patient refused to void  KIDNEYS:  Right kidney normal in appearance with a normal renal parenchymal component. Left kidney contains a cyst but is otherwise unremarkable. The abnormal area seen on recent CT of the abdomen, posterior to left kidney and involving the left psoas muscle, was not demonstrated on today's renal ultrasound. Elevated resistive indices, suggestive of possible medical renal disease. MASS/CYST: 2 cm benign-appearing exophytic cyst inferior aspect left kidney. HYDRONEPHROSIS:  There is no hydronephrosis. CALCULI:  No calculi are seen. BLADDER:  The urinary bladder is unremarkable. .     1. The abnormal soft tissue density posterior to the left kidney and involving the left psoas muscle seen on recent CT scan was not demonstrated on today's renal ultrasound. Findings on CT are suspicious for hematoma. Clinical correlation recommended. Follow-up CT of the abdomen or MRI of the abdomen would be helpful for further evaluation. 2. 2 cm benign-appearing left renal cyst. Findings suggestive of medical renal disease. **This report has been created using voice recognition software. It may contain minor errors which are inherent in voice recognition technology. ** Final report electronically signed by Dr. Silvestre Or on 10/3/2020 8:30 AM    Ct Abdomen Pelvis W Iv Contrast Additional Contrast? None    Result Date: 10/2/2020  PROCEDURE: CT ABDOMEN PELVIS W IV CONTRAST CLINICAL INFORMATION: RLQ abdominal pain, anorexia, looking for ileus/obstructions . Diarrhea COMPARISON: Lumbar MRI 7/31/2020 TECHNIQUE: 5 mm axial CT images were obtained through the abdomen and pelvis after the administration of intravenous and oral contrast. Coronal and sagittal reconstructions were obtained. All CT scans at this facility use dose modulation, iterative reconstruction, and/or weight-based dosing when appropriate to reduce radiation dose to as low as reasonably achievable. FINDINGS Lung base: 2 mm right lower lobe nodule. Liver and spleen: homogeneous attenuation, no masses seen. Biliary: normal; no calculi. Pancreas: head, body, and tail unremarkable. Adrenals: 14 mm x 11 mm right adrenal nodule is nonspecific. Kidneys: 2.7 cm low-attenuation focus left kidney suspicious for cyst. Near this is perinephric heterogeneous low-attenuation collection measuring up to 6.3 cm. It involves the adjacent psoas muscle. No definite gas within it though abscess is a consideration. Hematoma is possible. It was not present on previous MRI. No hydronephrosis. Tiny simple appearing cysts suggested on the right. Aorta: normal caliber. Lymph Nodes: normal size. Bowel: Normal caliber.  No evidence of obstruction. Questionable thickening of the distal esophagus. Bladder: Normal Reproductive: 3.2 cm x 2.6 cm left hypogastric soft tissue opacity. This may relate to the left ovary as there is a 8 mm associated cystic area. Uterus and right ovary may have been removed. Bones: normal for age. Complex structure extending out of the left kidney could relate to a hematoma as there is no definite gas within it. It will need to be followed. No definite bowel lesion. Small right adrenal nodule. **This report has been created using voice recognition software. It may contain minor errors which are inherent in voice recognition technology. ** Final report electronically signed by Dr. Natanael Schrader on 10/2/2020 6:44 PM       Follow-up scheduled after discharge :-    in the next few days with Hiren Oneal MD  tomorrow with Dr. Kendy Egan     Consultations during this hospital stay:-  [] NONE [] Cardiology  [] Nephrology  [] Hemo onco   [x] GI   [] ID  [] Endocrine  [] Pulm    [] Neuro    [] Psych   [] Urology  [] ENT   [] G SURGERY   []Ortho    []CV surg    [] Palliative  [] Hospice [] Pain management   []    []TCU   [] PT/OT  OTHERS:-    Disposition: home  Condition at Discharge: Stable    Time Spent:- 45 minutes    Electronically signed by OLMAN Jeff on 10/5/2020 at 6:36 PM  Discharging Hospitalist

## 2020-10-05 NOTE — CARE COORDINATION
10/5/20, 10:07 AM EDT  DISCHARGE PLANNING EVALUATION:    Niharika Grajeda       Admitted from: ED 10/2/2020/ [facility] day: 3   Location: -04/004-A Reason for admit: EYAL (acute kidney injury) ([facility] [address] [N17.9] Status: inpatient  Admit order signed?: yes  PMH:  has a past medical history of Arthritis, Hyperlipidemia, and Hypertension. Procedure: 10/4/20 EGD  Pertinent abnormal Imaging:no  Medications:  Scheduled Meds:   senna  2 tablet Oral Q1H    bisacodyl  10 mg Oral Once    potassium chloride  20 mEq Oral BID WC    pantoprazole  40 mg Oral Daily    PARoxetine  20 mg Oral QAM    sodium chloride flush  10 mL Intravenous 2 times per day     Continuous Infusions:   sodium chloride 75 mL/hr at 10/04/20 0334      Pertinent Info/Orders/Treatment Plan:  Hospitalist admitted and attending. GI consult for anemia. EGD yesterday. Planning to continue OP GI workup. Hgb 9.0  Diet: DIET CLEAR LIQUID;   Smoking status:  reports that she has never smoked. She has never used smokeless tobacco.   PCP: Kamran Lund MD  Readmission 30 days or less: no  Readmission Risk Score: 7%    Discharge Planning Evaluation  Current Residence:  Private Residence  Living Arrangements:  Alone   Support Systems:  Children  Current Services PTA:     Potential Assistance Needed:  N/A  Potential Assistance Purchasing Medications:  No  Does patient want to participate in local refill/ meds to beds program?  No  Type of Home Care Services:  None  Patient expects to be discharged to:  Home  Expected Discharge date:  10/04/20  Follow Up Appointment: Best Day/ Time:      Patient Goals/Plan/Treatment Preferences: I spoke with Ashley Duarte. She is planning to go home alone today. She denied any needs at discharge. Transportation/Food Security/Housekeeping Addressed:  No issues identified.  Evaluation: no   10/5/20, 10:13 AM EDT    Patient goals/plan/ treatment preferences discussed by  and .   Patient goals/plan/ treatment preferences reviewed with patient/ family. Patient/ family verbalize understanding of discharge plan and are in agreement with goal/plan/treatment preferences. Understanding was demonstrated using the teach back method. AVS provided by RN at time of discharge, which includes all necessary medical information pertaining to the patients current course of illness, treatment, post-discharge goals of care, and treatment preferences.         IMM Letter  IMM Letter given to Patient/Family/Significant other/Guardian/POA/by[de-identified]   IMM Letter date given[de-identified] 10/05/20  IMM Letter time given[de-identified] 0900

## 2020-10-05 NOTE — DISCHARGE INSTR - DIET

## 2020-10-05 NOTE — PROGRESS NOTES
Discharge instructions given to patient at this time. All belongings packed and ready for transport. Transport to escort to private car via wheelchair.

## 2020-10-05 NOTE — PROGRESS NOTES
cephalic/atraumatic. Yanira Shah NECK:  Neck supple. Trachea midline      UPPER EXTREMITIES:  No cyanosis, clubbing, or edema. DERM:  No rash or jaundice. LOWER EXTREMITIES:  No cyanosis, clubbing, or edema. NEURO:  Alert and oriented x4. Patient moves all extremities and has gross sensation in all extremities. Medications:  Scheduled Meds:   polyethylene glycol  17 g Oral Q1H    potassium chloride  20 mEq Oral BID WC    pantoprazole  40 mg Oral Daily    PARoxetine  20 mg Oral QAM    sodium chloride flush  10 mL Intravenous 2 times per day     Continuous Infusions:   sodium chloride 75 mL/hr at 10/04/20 0334     PRN Meds:loperamide, potassium chloride **OR** potassium alternative oral replacement **OR** potassium chloride, sodium chloride flush, acetaminophen **OR** acetaminophen, polyethylene glycol, promethazine **OR** ondansetron    EGD yesterday  IMPRESSION:  Esophagogastroduodenoscopy to second portion of duodenum,  normal esophagogastroduodenoscopy. No masses noted. No ulcers noted. Biopsies obtained from the small bowel to rule out malabsorption  syndrome.     RECOMMENDATIONS:  Start the patient on clear liquids. Start the patient on bowel prep colonoscopy tomorrow morning at 06:45 a.m. I have discussed with the patient regarding the colonoscopy, its indications and complications including but not limited to perforation, bleeding, infection, adverse reaction to medicine, very slight chance of missing significant lesions. The patient expressed her understanding. Further plans pending the colonoscopy results. Assessment:  · Nausea/vomiting-pt states she can't eat or drink  · Right sided abdominal pain  · Anemia; H/H 9.0/27.9 stable   Iron deficient   · Leukocytosis; WBC 13.0  · Obesity          Plan:  · EGD yesterday ; see above. Colonoscopy not done today though . Actually scheduled for tomorrow as OP. · Follow labs  · PRBC prn; defer to attending.    · On Protonix daily  · Antiemetics; Zofran and phenergan   · Called the office: dulcolax 10 mg now. Senokot 2 tab every hour x 6 all ordered  · She will be discharged closer to 1500. Someone needs to get to our office before 4, to get her Linzess and then she will need to take that once she is home. Colonoscopy  tomorrow at 1230; pt to be at office at 1130. This was discussed with her and written on her dry erase board in her room. Hospitalist/Attending provider notes, consulting physician notes, laboratory results and procedure notes reviewed prior to seeing the patient. Note done in collaboration with DR Juan Kirby.    Electronically signed by DOUG Rico CNP on 10/5/20 at 8:43 AM EDT

## 2020-10-05 NOTE — OP NOTE
[address] [id number]                                OPERATIVE REPORT    PATIENT NAME: Meghan Streeter                 :        1950  MED REC NO:   [id number]                           ROOM:       0018  ACCOUNT NO:   [de-identified]                           ADMIT DATE: 10/02/2020  PROVIDER:     SALVADOR Luis Smoker OF PROCEDURE:  10/04/2020    PROCEDURE:  Esophagogastroduodenoscopy. SURGEON:  Padmini Gill MD    INDICATIONS:  A 71-year-old pleasant female who presented to the  hospital with nausea, vomiting, right-sided abdominal pain, and drop in  hemoglobin. She is undergoing further evaluation. Please see my  consultation note for details and for physical examination. ASA CLASSIFICATION:  Class III. MEDICATIONS:  Fentanyl 125 mcg IV, Versed 5 mg IV. Conscious sedation  was given in incremental fashion to induce and sustain conscious  sedation. INSTRUMENT:  GIF-190 gastroscope. PHOTOGRAPHS:  Yes. BIOPSIES:  Yes. ESTIMATED BLOOD LOSS:  Less than 10 mL. CONSENT:  Procedure, indications, and complications including but not  limited to perforation, bleeding, infection, adverse reaction to  medicine, very slight chance of missing significant lesions discussed  with the patient and the patient expressed her understanding and a  written consent was obtained. DESCRIPTION OF PROCEDURE:  The patient was placed in the left lateral  decubitus position in the endo room #11. The patient was placed on  appropriate monitoring of vitals including blood pressure, heart rate,  and pulse oximetry. Oropharynx was sprayed with Cetacaine spray and  bite block was placed between maxilla and mandible.   After the adequate  conscious sedation was given in incremental fashion to induce and  sustain conscious sedation, the GIF-190 gastroscope was inserted into  the oropharynx and the esophagus was intubated under direct vision. The  scope was advanced down through the stomach into second portion of  duodenum. On careful inspection and on withdrawal of the scope, the  second and first portions of duodenum look normal as shown in picture  #A3 and #A4. Multiple biopsies obtained to rule out malabsorption  syndrome. Antrum of the stomach looks normal as shown in picture #A5. Lesser and greater curvature and body of the stomach look normal as  shown in picture #A6. On retroflex, fundus looks normal as shown in  picture #A7. In the distal esophagus, intact squamocolumnar junction at  gastroesophageal junction noted as shown in picture #A2. Mid and  proximal esophagus looks normal as shown in picture #A1. Air was  withdrawn as the scope was removed. The patient tolerated the procedure  well without any immediate complications. Vitals including blood  pressure, heart rate, and pulse oximetry were stable during the  procedure and after the procedure. The patient was transferred to the  recovery room in stable condition. IMPRESSION:  Esophagogastroduodenoscopy to second portion of duodenum,  normal esophagogastroduodenoscopy. No masses noted. No ulcers noted. Biopsies obtained from the small bowel to rule out malabsorption  syndrome. RECOMMENDATIONS:  Start the patient on clear liquids. Start the patient  on bowel prep colonoscopy tomorrow morning at 06:45 a.m. I have  discussed with the patient regarding the colonoscopy, its indications  and complications including but not limited to perforation, bleeding,  infection, adverse reaction to medicine, very slight chance of missing  significant lesions. The patient expressed her understanding. Further  plans pending the colonoscopy results. Thank you Dr. Marcella Muir and Gabriel Shields for letting me to do procedure on  the patient. Do not hesitate to call me if you have any questions. My  recommendations are above.         Monty Mcintyre M.D.    D: 10/04/2020 11:11:01       T: 10/04/2020 11:13:51     [id number]  Job#: [id number]     Doc#: [id number]    CC:  PAULY Mccormick M.D. Vonna Palau, M.D.

## 2020-10-05 NOTE — PROGRESS NOTES
Pt admitted to  [facility] from [address] [place] [address]  Complaints: EYAL, plans to go home in the morning. IV normal saline infusing into the forearm left, condition patent and no redness at a rate of 75 mls/ hour with about 900 mls in the bag still. IV site free of s/s of infection or infiltration. Vital signs obtained. Assessment and data collection initiated. Two nurse skin assessment performed by Robert Perdomo and [facility] RN. Oriented to room. Policies and procedures for  explained. All questions answered with no further questions at this time. Fall prevention and safety brochure discussed with patient. Bed alarm on. Call light in reach.

## 2020-10-12 LAB
MISC #3 REFERENCE REPORT: NORMAL
MISC. #2 REFERENCE REPORT: NORMAL

## 2022-04-27 ENCOUNTER — HOSPITAL ENCOUNTER (OUTPATIENT)
Dept: MRI IMAGING | Age: 72
Discharge: HOME | End: 2022-04-27
Payer: MEDICARE

## 2022-04-27 DIAGNOSIS — M54.16 LUMBAR RADICULOPATHY: ICD-10-CM

## 2022-04-27 DIAGNOSIS — M54.50 LOW BACK PAIN, UNSPECIFIED BACK PAIN LATERALITY, UNSPECIFIED CHRONICITY, UNSPECIFIED WHETHER SCIATICA PRESENT: ICD-10-CM

## 2022-04-27 PROCEDURE — 72148 MRI LUMBAR SPINE W/O DYE: CPT

## 2023-10-01 ENCOUNTER — HOSPITAL ENCOUNTER (EMERGENCY)
Age: 73
Discharge: HOME | End: 2023-10-01
Payer: MEDICARE

## 2023-10-01 VITALS
HEART RATE: 104 BPM | TEMPERATURE: 98 F | SYSTOLIC BLOOD PRESSURE: 131 MMHG | OXYGEN SATURATION: 94 % | RESPIRATION RATE: 20 BRPM | DIASTOLIC BLOOD PRESSURE: 69 MMHG

## 2023-10-01 DIAGNOSIS — J01.00 ACUTE MAXILLARY SINUSITIS, RECURRENCE NOT SPECIFIED: Primary | ICD-10-CM

## 2023-10-01 PROCEDURE — 99213 OFFICE O/P EST LOW 20 MIN: CPT | Performed by: NURSE PRACTITIONER

## 2023-10-01 PROCEDURE — 99213 OFFICE O/P EST LOW 20 MIN: CPT

## 2023-10-01 RX ORDER — BENZONATATE 200 MG/1
200 CAPSULE ORAL 3 TIMES DAILY PRN
Qty: 21 CAPSULE | Refills: 0 | Status: SHIPPED | OUTPATIENT
Start: 2023-10-01 | End: 2023-10-08

## 2023-10-01 RX ORDER — AMOXICILLIN AND CLAVULANATE POTASSIUM 875; 125 MG/1; MG/1
1 TABLET, FILM COATED ORAL 2 TIMES DAILY
Qty: 14 TABLET | Refills: 0 | Status: SHIPPED | OUTPATIENT
Start: 2023-10-01 | End: 2023-10-08

## 2023-10-01 RX ORDER — VITAMIN E (DL,TOCOPHERYL ACET) 45 MG/0.25
1 DROPS ORAL DAILY
Qty: 60 CAPSULE | Refills: 0 | Status: SHIPPED | OUTPATIENT
Start: 2023-10-01

## 2023-10-01 RX ORDER — AZELASTINE 1 MG/ML
1 SPRAY, METERED NASAL 2 TIMES DAILY
Qty: 30 ML | Refills: 0 | Status: SHIPPED | OUTPATIENT
Start: 2023-10-01

## 2023-10-01 RX ORDER — PREDNISONE 10 MG/1
10 TABLET ORAL 2 TIMES DAILY
COMMUNITY

## 2023-10-01 RX ORDER — GABAPENTIN 300 MG/1
300 CAPSULE ORAL 2 TIMES DAILY
COMMUNITY

## 2023-10-01 ASSESSMENT — ENCOUNTER SYMPTOMS
RHINORRHEA: 1
SNORING: 0
SHORTNESS OF BREATH: 0
WHEEZING: 0
CHOKING: 0
STRIDOR: 0
SORE THROAT: 1
SWOLLEN GLANDS: 0
NAUSEA: 0
COUGH: 1
APNEA: 0
SINUS PRESSURE: 1
HOARSE VOICE: 0
VOMITING: 0
CHEST TIGHTNESS: 0

## 2023-10-01 ASSESSMENT — PAIN SCALES - GENERAL: PAINLEVEL_OUTOF10: 7

## 2023-10-01 ASSESSMENT — PAIN - FUNCTIONAL ASSESSMENT: PAIN_FUNCTIONAL_ASSESSMENT: 0-10

## 2023-10-01 ASSESSMENT — PAIN DESCRIPTION - ORIENTATION: ORIENTATION: RIGHT

## 2023-10-01 ASSESSMENT — PAIN DESCRIPTION - LOCATION: LOCATION: EAR;HEAD

## 2023-10-01 NOTE — ED TRIAGE NOTES
Started this last Tuesday, with stuffiness, tried mucinex pills and cough syrup , having headaches/cough/right ear pain, history of sinus problem/surgery, had been ok for the last 2 years

## 2023-12-08 ENCOUNTER — HOSPITAL ENCOUNTER (OUTPATIENT)
Dept: NUCLEAR MEDICINE | Age: 73
Discharge: HOME | End: 2023-12-08
Attending: FAMILY MEDICINE
Payer: MEDICARE

## 2023-12-08 ENCOUNTER — HOSPITAL ENCOUNTER (OUTPATIENT)
Age: 73
End: 2023-12-08
Attending: FAMILY MEDICINE
Payer: MEDICARE

## 2023-12-08 VITALS — HEIGHT: 63 IN | WEIGHT: 213 LBS | BODY MASS INDEX: 37.74 KG/M2

## 2023-12-08 DIAGNOSIS — R07.89 CHEST DISCOMFORT: ICD-10-CM

## 2023-12-08 LAB
ECHO BSA: 2.07 M2
NUC STRESS EJECTION FRACTION: 58 %
STRESS BASELINE DIAS BP: 60 MMHG
STRESS BASELINE HR: 97 BPM
STRESS BASELINE SYS BP: 118 MMHG
STRESS STAGE 1 BP: NORMAL MMHG
STRESS STAGE 1 DURATION: 1 MIN:SEC
STRESS STAGE 1 HR: 96 BPM
STRESS STAGE 2 BP: NORMAL MMHG
STRESS STAGE 2 DURATION: 1 MIN:SEC
STRESS STAGE 2 HR: 106 BPM
STRESS STAGE 3 BP: NORMAL MMHG
STRESS STAGE 3 DURATION: 1 MIN:SEC
STRESS STAGE 3 HR: 101 BPM
STRESS STAGE RECOVERY 1 BP: NORMAL MMHG
STRESS STAGE RECOVERY 1 DURATION: 1 MIN:SEC
STRESS STAGE RECOVERY 1 HR: 103 BPM
STRESS STAGE RECOVERY 2 BP: NORMAL MMHG
STRESS STAGE RECOVERY 2 DURATION: 1 MIN:SEC
STRESS STAGE RECOVERY 2 HR: 107 BPM
STRESS STAGE RECOVERY 3 BP: NORMAL MMHG
STRESS STAGE RECOVERY 3 DURATION: 1 MIN:SEC
STRESS STAGE RECOVERY 3 HR: 100 BPM
STRESS STAGE RECOVERY 4 BP: NORMAL MMHG
STRESS STAGE RECOVERY 4 DURATION: 1 MIN:SEC
STRESS STAGE RECOVERY 4 HR: 100 BPM
STRESS TARGET HR: 149 BPM

## 2023-12-08 PROCEDURE — 6360000002 HC RX W HCPCS: Performed by: FAMILY MEDICINE

## 2023-12-08 PROCEDURE — 93017 CV STRESS TEST TRACING ONLY: CPT

## 2023-12-08 PROCEDURE — A9500 TC99M SESTAMIBI: HCPCS | Performed by: FAMILY MEDICINE

## 2023-12-08 PROCEDURE — 78452 HT MUSCLE IMAGE SPECT MULT: CPT

## 2023-12-08 PROCEDURE — 3430000000 HC RX DIAGNOSTIC RADIOPHARMACEUTICAL: Performed by: FAMILY MEDICINE

## 2023-12-08 RX ORDER — REGADENOSON 0.08 MG/ML
0.4 INJECTION, SOLUTION INTRAVENOUS
Status: COMPLETED | OUTPATIENT
Start: 2023-12-08 | End: 2023-12-08

## 2023-12-08 RX ORDER — TETRAKIS(2-METHOXYISOBUTYLISOCYANIDE)COPPER(I) TETRAFLUOROBORATE 1 MG/ML
10.6 INJECTION, POWDER, LYOPHILIZED, FOR SOLUTION INTRAVENOUS
Status: COMPLETED | OUTPATIENT
Start: 2023-12-08 | End: 2023-12-08

## 2023-12-08 RX ORDER — TETRAKIS(2-METHOXYISOBUTYLISOCYANIDE)COPPER(I) TETRAFLUOROBORATE 1 MG/ML
35 INJECTION, POWDER, LYOPHILIZED, FOR SOLUTION INTRAVENOUS
Status: COMPLETED | OUTPATIENT
Start: 2023-12-08 | End: 2023-12-08

## 2023-12-08 RX ADMIN — REGADENOSON 0.4 MG: 0.08 INJECTION, SOLUTION INTRAVENOUS at 13:15

## 2023-12-08 RX ADMIN — Medication 10.6 MILLICURIE: at 12:15

## 2023-12-08 RX ADMIN — Medication 35 MILLICURIE: at 13:10

## 2024-07-04 ENCOUNTER — APPOINTMENT (OUTPATIENT)
Dept: GENERAL RADIOLOGY | Age: 74
End: 2024-07-04
Payer: MEDICARE

## 2024-07-04 ENCOUNTER — HOSPITAL ENCOUNTER (EMERGENCY)
Age: 74
Discharge: HOME | End: 2024-07-04
Payer: MEDICARE

## 2024-07-04 VITALS
HEART RATE: 94 BPM | DIASTOLIC BLOOD PRESSURE: 83 MMHG | TEMPERATURE: 98.4 F | BODY MASS INDEX: 36.31 KG/M2 | OXYGEN SATURATION: 98 % | WEIGHT: 205 LBS | RESPIRATION RATE: 17 BRPM | SYSTOLIC BLOOD PRESSURE: 145 MMHG

## 2024-07-04 DIAGNOSIS — M54.50 ACUTE EXACERBATION OF CHRONIC LOW BACK PAIN: Primary | ICD-10-CM

## 2024-07-04 DIAGNOSIS — G89.29 ACUTE EXACERBATION OF CHRONIC LOW BACK PAIN: Primary | ICD-10-CM

## 2024-07-04 LAB
ANION GAP SERPL CALC-SCNC: 8 MEQ/L (ref 8–16)
BASOPHILS ABSOLUTE: 0 THOU/MM3 (ref 0–0.1)
BASOPHILS NFR BLD AUTO: 0.5 %
BUN SERPL-MCNC: 18 MG/DL (ref 7–22)
CALCIUM SERPL-MCNC: 9.3 MG/DL (ref 8.5–10.5)
CHLORIDE SERPL-SCNC: 105 MEQ/L (ref 98–111)
CO2 SERPL-SCNC: 27 MEQ/L (ref 23–33)
CREAT SERPL-MCNC: 1 MG/DL (ref 0.4–1.2)
CRP SERPL-MCNC: < 0.3 MG/DL (ref 0–1)
DEPRECATED RDW RBC AUTO: 45.5 FL (ref 35–45)
EOSINOPHIL NFR BLD AUTO: 0.2 %
EOSINOPHILS ABSOLUTE: 0 THOU/MM3 (ref 0–0.4)
ERYTHROCYTE [DISTWIDTH] IN BLOOD BY AUTOMATED COUNT: 13.8 % (ref 11.5–14.5)
ERYTHROCYTE [SEDIMENTATION RATE] IN BLOOD BY WESTERGREN METHOD: 9 MM/HR (ref 0–20)
GFR SERPL CREATININE-BSD FRML MDRD: 60 ML/MIN/1.73M2
GLUCOSE SERPL-MCNC: 128 MG/DL (ref 70–108)
HCT VFR BLD AUTO: 37.2 % (ref 37–47)
HGB BLD-MCNC: 12.1 GM/DL (ref 12–16)
IMM GRANULOCYTES # BLD AUTO: 0.04 THOU/MM3 (ref 0–0.07)
IMM GRANULOCYTES NFR BLD AUTO: 0.6 %
LYMPHOCYTES ABSOLUTE: 0.7 THOU/MM3 (ref 1–4.8)
LYMPHOCYTES NFR BLD AUTO: 11.2 %
MCH RBC QN AUTO: 29.5 PG (ref 26–33)
MCHC RBC AUTO-ENTMCNC: 32.5 GM/DL (ref 32.2–35.5)
MCV RBC AUTO: 90.7 FL (ref 81–99)
MONOCYTES ABSOLUTE: 0.3 THOU/MM3 (ref 0.4–1.3)
MONOCYTES NFR BLD AUTO: 4 %
NEUTROPHILS ABSOLUTE: 5.3 THOU/MM3 (ref 1.8–7.7)
NEUTROPHILS NFR BLD AUTO: 83.5 %
NRBC BLD AUTO-RTO: 0 /100 WBC
OSMOLALITY SERPL CALC.SUM OF ELEC: 282.9 MOSMOL/KG (ref 275–300)
PLATELET # BLD AUTO: 274 THOU/MM3 (ref 130–400)
PMV BLD AUTO: 9.5 FL (ref 9.4–12.4)
POTASSIUM SERPL-SCNC: 3.9 MEQ/L (ref 3.5–5.2)
RBC # BLD AUTO: 4.1 MILL/MM3 (ref 4.2–5.4)
SODIUM SERPL-SCNC: 140 MEQ/L (ref 135–145)
WBC # BLD AUTO: 6.3 THOU/MM3 (ref 4.8–10.8)

## 2024-07-04 PROCEDURE — 73502 X-RAY EXAM HIP UNI 2-3 VIEWS: CPT

## 2024-07-04 PROCEDURE — 72100 X-RAY EXAM L-S SPINE 2/3 VWS: CPT

## 2024-07-04 PROCEDURE — 99284 EMERGENCY DEPT VISIT MOD MDM: CPT

## 2024-07-04 PROCEDURE — 36415 COLL VENOUS BLD VENIPUNCTURE: CPT

## 2024-07-04 PROCEDURE — 85651 RBC SED RATE NONAUTOMATED: CPT

## 2024-07-04 PROCEDURE — 86140 C-REACTIVE PROTEIN: CPT

## 2024-07-04 PROCEDURE — 6370000000 HC RX 637 (ALT 250 FOR IP): Performed by: NURSE PRACTITIONER

## 2024-07-04 PROCEDURE — 80048 BASIC METABOLIC PNL TOTAL CA: CPT

## 2024-07-04 PROCEDURE — 85025 COMPLETE CBC W/AUTO DIFF WBC: CPT

## 2024-07-04 RX ORDER — OXYCODONE HYDROCHLORIDE AND ACETAMINOPHEN 5; 325 MG/1; MG/1
1 TABLET ORAL ONCE
Status: COMPLETED | OUTPATIENT
Start: 2024-07-04 | End: 2024-07-04

## 2024-07-04 RX ORDER — MORPHINE SULFATE 4 MG/ML
4 INJECTION, SOLUTION INTRAMUSCULAR; INTRAVENOUS ONCE
Status: DISCONTINUED | OUTPATIENT
Start: 2024-07-04 | End: 2024-07-04

## 2024-07-04 RX ORDER — ONDANSETRON 2 MG/ML
4 INJECTION INTRAMUSCULAR; INTRAVENOUS ONCE
Status: DISCONTINUED | OUTPATIENT
Start: 2024-07-04 | End: 2024-07-04

## 2024-07-04 RX ORDER — CYCLOBENZAPRINE HCL 10 MG
10 TABLET ORAL ONCE
Status: COMPLETED | OUTPATIENT
Start: 2024-07-04 | End: 2024-07-04

## 2024-07-04 RX ORDER — HYDROCODONE BITARTRATE AND ACETAMINOPHEN 5; 325 MG/1; MG/1
1 TABLET ORAL EVERY 6 HOURS PRN
Qty: 12 TABLET | Refills: 0 | Status: SHIPPED | OUTPATIENT
Start: 2024-07-04 | End: 2024-07-07

## 2024-07-04 RX ORDER — CYCLOBENZAPRINE HCL 5 MG
5 TABLET ORAL 3 TIMES DAILY PRN
Qty: 15 TABLET | Refills: 0 | Status: SHIPPED | OUTPATIENT
Start: 2024-07-04 | End: 2024-07-09

## 2024-07-04 RX ADMIN — CYCLOBENZAPRINE 10 MG: 10 TABLET, FILM COATED ORAL at 20:03

## 2024-07-04 RX ADMIN — OXYCODONE HYDROCHLORIDE AND ACETAMINOPHEN 1 TABLET: 5; 325 TABLET ORAL at 20:03

## 2024-07-04 ASSESSMENT — PAIN SCALES - GENERAL
PAINLEVEL_OUTOF10: 5
PAINLEVEL_OUTOF10: 5

## 2024-07-04 ASSESSMENT — PAIN DESCRIPTION - LOCATION: LOCATION: BACK

## 2024-07-04 ASSESSMENT — PAIN DESCRIPTION - PAIN TYPE: TYPE: CHRONIC PAIN

## 2024-07-04 ASSESSMENT — PAIN DESCRIPTION - DESCRIPTORS: DESCRIPTORS: ACHING

## 2024-07-04 NOTE — ED NOTES
Pt to rm 37 per EMS- pt states she's mostly been in bed all day, has chronic back and bilateral hip pain. States she got up a little while ago and her left hip/leg gave out causing her to fall onto carpeted floor. Pt states she just wants to be sure everything is OK. Denies hitting her head or any LOC. Pt appears in no acute distress. VSS.

## 2024-07-05 NOTE — DISCHARGE INSTRUCTIONS
Call today or tomorrow to follow up with Roshan Cedeno MD  in 2 days.    Use an ice pack or bag filled with ice and apply to the injured area 3 - 4 times a day for 15 - 20 minutes each time.  If the injury is older than 3 days, then use a heating pad to help relax the muscles in your back.    Use ibuprofen or Tylenol (unless prescribed medications that have Tylenol in it) for pain.  You can take over the counter Ibuprofen (advil) tablets (4 tablets every 8 hours or 3 tablets every 6 hours or 2 tablets every 4 hours)    Dustin' Flexion Exercises     1. Pelvic tilt. Lie on your back with knees bent, feet flat on floor. Flatten the small of your back against the floor, without pushing down with the legs. Hold for 5 to 10 seconds.     2. Single Knee to chest. Lie on your back with knees bent and feet flat on the floor. Slowly pull your right knee toward your shoulder and hold 5 to 10 seconds. Lower the knee and repeat with the other knee.     3. Double knee to chest. Begin as in the previous exercise. After pulling right knee to chest, pull left knee to chest and hold both knees for 5 to 10 seconds. Slowly lower one leg at a time.     4. Partial sit-up. Do the pelvic tilt (exercise 1) and, while holding this position, slowly curl your head and shoulders off the floor. Hold briefly. Return slowly to the starting position.     5. Hamstring stretch. Start in long sitting with toes directed toward the ceiling and knees fully extended. Slowly lower the trunk forward over the legs, keeping knees extended, arms outstretched over the legs, and eyes focus ahead.     6. Hip Flexor stretch. Place one foot in front of the other with the left (front) knee flexed and the right (back) knee held rigidly straight. Flex forward through the trunk until the left knee contacts the axillary fold (arm pit region). Repeat with right leg forward and left leg back.     7. Squat. Stand with both feet parallel, about shoulder's width apart.  Attempting to maintain the trunk as perpendicular as possible to the floor, eyes focused ahead, and feet flat on the floor, the subject slowly lowers his body by flexing his knees    Return to the Emergency Department for inability to move legs, worsening of pain, tingling / loss of sensation, any other care or concern.

## 2024-07-09 NOTE — ED PROVIDER NOTES
tablet, R-0Normal             FINAL IMPRESSION      1. Acute exacerbation of chronic low back pain          DISPOSITION/PLAN   DISPOSITION Decision To Discharge 07/04/2024 09:11:26 PM      OUTPATIENT FOLLOW UP THE PATIENT:  Sujata Cruz MD  [address] [place], [place] [id number]  [phone]    Schedule an appointment as soon as possible for a visit in 2 days  For follow up      DOUG Maria CNP, Kristy J, APRN - CNP  07/09/24 [id number]

## 2025-01-22 ENCOUNTER — HOSPITAL ENCOUNTER (OUTPATIENT)
Age: 75
Discharge: HOME | End: 2025-01-24
Attending: FAMILY MEDICINE
Payer: MEDICARE

## 2025-01-22 DIAGNOSIS — R06.02 SHORTNESS OF BREATH: ICD-10-CM

## 2025-01-22 LAB
ECHO AO ASC DIAM: 3.6 CM
ECHO AO SINUS VALSALVA DIAM: 3.3 CM
ECHO AO ST JNCT DIAM: 3.1 CM
ECHO AV CUSP MM: 1.9 CM
ECHO AV MEAN GRADIENT: 3 MMHG
ECHO AV MEAN VELOCITY: 0.8 M/S
ECHO AV PEAK GRADIENT: 6 MMHG
ECHO AV PEAK VELOCITY: 1.2 M/S
ECHO AV VELOCITY RATIO: 0.67
ECHO AV VTI: 20.9 CM
ECHO EST RA PRESSURE: 3 MMHG
ECHO LA AREA 2C: 12.7 CM2
ECHO LA AREA 4C: 16 CM2
ECHO LA DIAMETER: 2.8 CM
ECHO LA MAJOR AXIS: 5 CM
ECHO LA MINOR AXIS: 4.2 CM
ECHO LA VOL BP: 38 ML (ref 22–52)
ECHO LA VOL MOD A2C: 32 ML (ref 22–52)
ECHO LA VOL MOD A4C: 39 ML (ref 22–52)
ECHO LV E' LATERAL VELOCITY: 8.2 CM/S
ECHO LV E' SEPTAL VELOCITY: 7 CM/S
ECHO LV EJECTION FRACTION BIPLANE: 55 % (ref 55–100)
ECHO LV FRACTIONAL SHORTENING: 27 % (ref 28–44)
ECHO LV INTERNAL DIMENSION DIASTOLIC: 3.3 CM (ref 3.9–5.3)
ECHO LV INTERNAL DIMENSION SYSTOLIC: 2.4 CM
ECHO LV ISOVOLUMETRIC RELAXATION TIME (IVRT): 99 MS
ECHO LV IVSD: 1.1 CM (ref 0.6–0.9)
ECHO LV MASS 2D: 109.1 G (ref 67–162)
ECHO LV POSTERIOR WALL DIASTOLIC: 1.1 CM (ref 0.6–0.9)
ECHO LV RELATIVE WALL THICKNESS RATIO: 0.67
ECHO LVOT AV VTI INDEX: 0.74
ECHO LVOT MEAN GRADIENT: 1 MMHG
ECHO LVOT PEAK GRADIENT: 2 MMHG
ECHO LVOT PEAK VELOCITY: 0.8 M/S
ECHO LVOT VTI: 15.4 CM
ECHO MV A VELOCITY: 0.8 M/S
ECHO MV E DECELERATION TIME (DT): 185 MS
ECHO MV E VELOCITY: 0.55 M/S
ECHO MV E/A RATIO: 0.69
ECHO MV E/E' LATERAL: 6.71
ECHO MV E/E' RATIO (AVERAGED): 7.28
ECHO MV E/E' SEPTAL: 7.86
ECHO PV MAX VELOCITY: 1 M/S
ECHO PV PEAK GRADIENT: 4 MMHG
ECHO RIGHT VENTRICULAR SYSTOLIC PRESSURE (RVSP): 21 MMHG
ECHO RV FREE WALL PEAK S': 18 CM/S
ECHO RV INTERNAL DIMENSION: 2.7 CM
ECHO RV TAPSE: 2 CM (ref 1.7–?)
ECHO TV E WAVE: 0.4 M/S
ECHO TV REGURGITANT MAX VELOCITY: 2.14 M/S
ECHO TV REGURGITANT PEAK GRADIENT: 18 MMHG

## 2025-01-22 PROCEDURE — 93306 TTE W/DOPPLER COMPLETE: CPT | Performed by: INTERNAL MEDICINE

## 2025-01-22 PROCEDURE — 93306 TTE W/DOPPLER COMPLETE: CPT

## 2025-05-22 ENCOUNTER — HOSPITAL ENCOUNTER (EMERGENCY)
Age: 75
Discharge: HOME | End: 2025-05-22
Payer: MEDICARE

## 2025-05-22 ENCOUNTER — APPOINTMENT (OUTPATIENT)
Dept: GENERAL RADIOLOGY | Age: 75
End: 2025-05-22
Payer: MEDICARE

## 2025-05-22 ENCOUNTER — APPOINTMENT (OUTPATIENT)
Dept: CT IMAGING | Age: 75
End: 2025-05-22
Payer: MEDICARE

## 2025-05-22 VITALS
TEMPERATURE: 98.2 F | HEART RATE: 86 BPM | SYSTOLIC BLOOD PRESSURE: 128 MMHG | DIASTOLIC BLOOD PRESSURE: 69 MMHG | OXYGEN SATURATION: 92 % | RESPIRATION RATE: 21 BRPM

## 2025-05-22 DIAGNOSIS — S09.90XA CLOSED HEAD INJURY WITHOUT LOSS OF CONSCIOUSNESS, INITIAL ENCOUNTER: ICD-10-CM

## 2025-05-22 DIAGNOSIS — S80.01XA CONTUSION OF RIGHT KNEE, INITIAL ENCOUNTER: ICD-10-CM

## 2025-05-22 DIAGNOSIS — S80.02XA CONTUSION OF LEFT KNEE, INITIAL ENCOUNTER: ICD-10-CM
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
## 2025-05-22 LAB
EKG ATRIAL RATE: 85 BPM
EKG P AXIS: 6 DEGREES
EKG P-R INTERVAL: 154 MS
EKG Q-T INTERVAL: 350 MS
EKG QRS DURATION: 80 MS
EKG QTC CALCULATION (BAZETT): 416 MS
EKG R AXIS: 12 DEGREES
EKG T AXIS: 11 DEGREES
EKG VENTRICULAR RATE: 85 BPM

## 2025-05-22 PROCEDURE — 93005 ELECTROCARDIOGRAM TRACING: CPT | Performed by: NURSE PRACTITIONER

## 2025-05-22 PROCEDURE — 73564 X-RAY EXAM KNEE 4 OR MORE: CPT

## 2025-05-22 PROCEDURE — 93010 ELECTROCARDIOGRAM REPORT: CPT | Performed by: INTERNAL MEDICINE

## 2025-05-22 PROCEDURE — 72170 X-RAY EXAM OF PELVIS: CPT

## 2025-05-22 PROCEDURE — 6370000000 HC RX 637 (ALT 250 FOR IP): Performed by: NURSE PRACTITIONER

## 2025-05-22 PROCEDURE — 72125 CT NECK SPINE W/O DYE: CPT

## 2025-05-22 PROCEDURE — 99284 EMERGENCY DEPT VISIT MOD MDM: CPT

## 2025-05-22 PROCEDURE — 70450 CT HEAD/BRAIN W/O DYE: CPT

## 2025-05-22 RX ORDER — HYDROCODONE BITARTRATE AND ACETAMINOPHEN 5; 325 MG/1; MG/1
1 TABLET ORAL ONCE
Status: COMPLETED | OUTPATIENT
Start: 2025-05-22 | End: 2025-05-22

## 2025-05-22 RX ADMIN — HYDROCODONE BITARTRATE AND ACETAMINOPHEN 1 TABLET: 5; 325 TABLET ORAL at 12:37

## 2025-05-22 ASSESSMENT — PAIN SCALES - GENERAL: PAINLEVEL_OUTOF10: 5

## 2025-05-22 ASSESSMENT — PAIN DESCRIPTION - ORIENTATION: ORIENTATION: LEFT

## 2025-05-22 ASSESSMENT — PAIN - FUNCTIONAL ASSESSMENT: PAIN_FUNCTIONAL_ASSESSMENT: 0-10

## 2025-05-22 ASSESSMENT — PAIN DESCRIPTION - LOCATION: LOCATION: HIP

## 2025-05-22 NOTE — ED PROVIDER NOTES
[facility] EMERGENCY DEPARTMENT      EMERGENCY MEDICINE     Pt Name: Shruti Gomez  MRN: [id number]  Birthdate 1950  Date of evaluation: 5/22/2025  Provider: DOUG Merida - CNP    CHIEF COMPLAINT       Chief Complaint   Patient presents with    Fall    Hip Pain     HISTORY OF PRESENT ILLNESS   Shruti Gomez is a pleasant 74 y.o. female with a past medical history of bilateral hip replacements, lumbar spine surgery.  Patient presents to ED after mechanical fall after missing step while walking outdoors this morning.  Patient states she fell onto her knees and then ended up on her back admits that she did hit back of head but denies loss of consciousness, vision changes, syncope, lightheadedness, dizziness.  Does admit to mild headache,bilateral knee pain and left hip pain.  Pain of left knee is worse than right.  Range of motion is limited by pain.     PASTMEDICAL HISTORY     Past Medical History:   Diagnosis Date    Arthritis     Hyperlipidemia     Hypertension        Patient Active Problem List   Diagnosis Code    EYAL (acute kidney injury) N17.9     SURGICAL HISTORY       Past Surgical History:   Procedure Laterality Date    CARPAL TUNNEL RELEASE Bilateral     CHOLECYSTECTOMY      HYSTERECTOMY (CERVIX STATUS UNKNOWN)      partial    SINUS SURGERY      TOTAL HIP ARTHROPLASTY Right 10/01/2021    UPPER GASTROINTESTINAL ENDOSCOPY N/A 10/04/2020    EGD BIOPSY performed by Memo Barrow MD at [facility]       CURRENT MEDICATIONS       Previous Medications    AMLODIPINE (NORVASC) 10 MG TABLET    Take 10 mg by mouth daily    AZELASTINE (ASTELIN) 0.1 % NASAL SPRAY    1 spray by Nasal route 2 times daily Use in each nostril as directed    GABAPENTIN (NEURONTIN) 300 MG CAPSULE    Take 100 mg by mouth in the morning and at bedtime.    HYDRALAZINE (APRESOLINE) 50 MG TABLET    Take 50 mg by mouth 3 times daily    IRBESARTAN (AVAPRO) 300 MG TABLET    Take 300 mg by mouth nightly    MELOXICAM (MOBIC)

## 2025-05-22 NOTE — ED NOTES
Pt to ED via [place] from eye doctor c/o fall with acute left hip pain and bilateral knee pain. Pt states she missed a step and tried to catch herself on someone's car but slipped. Pt states she hit her head on the concrete. Pt denies LOC or use of blood thinners. Pt denies using a cane or walker at home. VSS. Pt hx of back surgery. Pt AOx4